# Patient Record
Sex: FEMALE | Race: WHITE | Employment: UNEMPLOYED | URBAN - METROPOLITAN AREA
[De-identification: names, ages, dates, MRNs, and addresses within clinical notes are randomized per-mention and may not be internally consistent; named-entity substitution may affect disease eponyms.]

---

## 2017-03-08 ENCOUNTER — OFFICE VISIT (OUTPATIENT)
Dept: FAMILY MEDICINE CLINIC | Age: 57
End: 2017-03-08

## 2017-03-08 VITALS
TEMPERATURE: 97.3 F | WEIGHT: 234.2 LBS | SYSTOLIC BLOOD PRESSURE: 128 MMHG | DIASTOLIC BLOOD PRESSURE: 75 MMHG | RESPIRATION RATE: 12 BRPM | HEART RATE: 86 BPM | BODY MASS INDEX: 43.54 KG/M2 | OXYGEN SATURATION: 97 %

## 2017-03-08 DIAGNOSIS — F32.A DEPRESSION, UNSPECIFIED DEPRESSION TYPE: ICD-10-CM

## 2017-03-08 DIAGNOSIS — G47.33 OBSTRUCTIVE SLEEP APNEA SYNDROME: ICD-10-CM

## 2017-03-08 DIAGNOSIS — F98.8 ATTENTION DEFICIT DISORDER: Primary | ICD-10-CM

## 2017-03-08 DIAGNOSIS — Z13.220 LIPID SCREENING: ICD-10-CM

## 2017-03-08 DIAGNOSIS — F50.81 BINGE EATING DISORDER: ICD-10-CM

## 2017-03-08 DIAGNOSIS — Z13.1 DIABETES MELLITUS SCREENING: ICD-10-CM

## 2017-03-08 DIAGNOSIS — E03.9 ACQUIRED HYPOTHYROIDISM: ICD-10-CM

## 2017-03-08 DIAGNOSIS — Z11.59 NEED FOR HEPATITIS C SCREENING TEST: ICD-10-CM

## 2017-03-08 LAB
A/G RATIO: 1.6 (ref 1.1–2.2)
ALBUMIN SERPL-MCNC: 4.6 G/DL (ref 3.4–5)
ALP BLD-CCNC: 89 U/L (ref 40–129)
ALT SERPL-CCNC: 37 U/L (ref 10–40)
ANION GAP SERPL CALCULATED.3IONS-SCNC: 16 MMOL/L (ref 3–16)
AST SERPL-CCNC: 23 U/L (ref 15–37)
BILIRUB SERPL-MCNC: <0.2 MG/DL (ref 0–1)
BUN BLDV-MCNC: 15 MG/DL (ref 7–20)
CALCIUM SERPL-MCNC: 9.8 MG/DL (ref 8.3–10.6)
CHLORIDE BLD-SCNC: 101 MMOL/L (ref 99–110)
CHOLESTEROL, TOTAL: 220 MG/DL (ref 0–199)
CO2: 25 MMOL/L (ref 21–32)
CREAT SERPL-MCNC: 0.8 MG/DL (ref 0.6–1.1)
GFR AFRICAN AMERICAN: >60
GFR NON-AFRICAN AMERICAN: >60
GLOBULIN: 2.9 G/DL
GLUCOSE BLD-MCNC: 93 MG/DL (ref 70–99)
HDLC SERPL-MCNC: 89 MG/DL (ref 40–60)
HEPATITIS C ANTIBODY INTERPRETATION: NORMAL
LDL CHOLESTEROL CALCULATED: 112 MG/DL
POTASSIUM SERPL-SCNC: 4.4 MMOL/L (ref 3.5–5.1)
SODIUM BLD-SCNC: 142 MMOL/L (ref 136–145)
T4 FREE: 1 NG/DL (ref 0.9–1.8)
TOTAL PROTEIN: 7.5 G/DL (ref 6.4–8.2)
TRIGL SERPL-MCNC: 97 MG/DL (ref 0–150)
TSH REFLEX: 4.91 UIU/ML (ref 0.27–4.2)
VLDLC SERPL CALC-MCNC: 19 MG/DL

## 2017-03-08 PROCEDURE — 99214 OFFICE O/P EST MOD 30 MIN: CPT | Performed by: FAMILY MEDICINE

## 2017-03-08 RX ORDER — DEXTROAMPHETAMINE SACCHARATE, AMPHETAMINE ASPARTATE MONOHYDRATE, DEXTROAMPHETAMINE SULFATE AND AMPHETAMINE SULFATE 5; 5; 5; 5 MG/1; MG/1; MG/1; MG/1
20 CAPSULE, EXTENDED RELEASE ORAL EVERY MORNING
Qty: 30 CAPSULE | Refills: 0 | Status: CANCELLED | OUTPATIENT
Start: 2017-05-07

## 2017-03-08 RX ORDER — DEXTROAMPHETAMINE SACCHARATE, AMPHETAMINE ASPARTATE MONOHYDRATE, DEXTROAMPHETAMINE SULFATE AND AMPHETAMINE SULFATE 5; 5; 5; 5 MG/1; MG/1; MG/1; MG/1
20 CAPSULE, EXTENDED RELEASE ORAL EVERY MORNING
Qty: 30 CAPSULE | Refills: 0 | Status: CANCELLED | OUTPATIENT
Start: 2017-03-08

## 2017-03-08 RX ORDER — LEVOTHYROXINE SODIUM 0.15 MG/1
150 TABLET ORAL DAILY
Qty: 90 TABLET | Refills: 1 | Status: SHIPPED | OUTPATIENT
Start: 2017-03-08 | End: 2017-06-05 | Stop reason: SDUPTHER

## 2017-03-08 RX ORDER — DEXTROAMPHETAMINE SACCHARATE, AMPHETAMINE ASPARTATE MONOHYDRATE, DEXTROAMPHETAMINE SULFATE AND AMPHETAMINE SULFATE 5; 5; 5; 5 MG/1; MG/1; MG/1; MG/1
20 CAPSULE, EXTENDED RELEASE ORAL EVERY MORNING
Qty: 30 CAPSULE | Refills: 0 | Status: CANCELLED | OUTPATIENT
Start: 2017-04-07

## 2017-03-08 RX ORDER — VENLAFAXINE HYDROCHLORIDE 37.5 MG/1
37.5 CAPSULE, EXTENDED RELEASE ORAL DAILY
Qty: 30 CAPSULE | Refills: 5 | Status: SHIPPED | OUTPATIENT
Start: 2017-03-08 | End: 2017-08-23 | Stop reason: SDUPTHER

## 2017-05-11 ENCOUNTER — OFFICE VISIT (OUTPATIENT)
Dept: FAMILY MEDICINE CLINIC | Age: 57
End: 2017-05-11

## 2017-05-11 VITALS
TEMPERATURE: 98.1 F | WEIGHT: 240.6 LBS | DIASTOLIC BLOOD PRESSURE: 65 MMHG | HEART RATE: 96 BPM | SYSTOLIC BLOOD PRESSURE: 141 MMHG | RESPIRATION RATE: 18 BRPM | BODY MASS INDEX: 44.72 KG/M2

## 2017-05-11 DIAGNOSIS — H66.001 ACUTE SUPPURATIVE OTITIS MEDIA OF RIGHT EAR WITHOUT SPONTANEOUS RUPTURE OF TYMPANIC MEMBRANE, RECURRENCE NOT SPECIFIED: Primary | ICD-10-CM

## 2017-05-11 PROCEDURE — 99213 OFFICE O/P EST LOW 20 MIN: CPT | Performed by: FAMILY MEDICINE

## 2017-05-11 RX ORDER — POLYMYXIN B SULFATE AND TRIMETHOPRIM 1; 10000 MG/ML; [USP'U]/ML
1 SOLUTION OPHTHALMIC EVERY 4 HOURS
COMMUNITY
End: 2017-06-05

## 2017-05-11 RX ORDER — PREDNISONE 20 MG/1
20 TABLET ORAL 2 TIMES DAILY
Qty: 8 TABLET | Refills: 0 | Status: SHIPPED | OUTPATIENT
Start: 2017-05-11 | End: 2017-05-15

## 2017-05-11 RX ORDER — SULFAMETHOXAZOLE AND TRIMETHOPRIM 800; 160 MG/1; MG/1
1 TABLET ORAL 2 TIMES DAILY
COMMUNITY
End: 2017-06-05

## 2017-05-11 RX ORDER — CEFDINIR 300 MG/1
300 CAPSULE ORAL 2 TIMES DAILY
Qty: 20 CAPSULE | Refills: 0 | Status: SHIPPED | OUTPATIENT
Start: 2017-05-11 | End: 2017-05-21

## 2017-05-26 ENCOUNTER — TELEPHONE (OUTPATIENT)
Dept: SLEEP MEDICINE | Age: 57
End: 2017-05-26

## 2017-06-05 ENCOUNTER — OFFICE VISIT (OUTPATIENT)
Dept: FAMILY MEDICINE CLINIC | Age: 57
End: 2017-06-05

## 2017-06-05 VITALS
HEART RATE: 103 BPM | TEMPERATURE: 98.2 F | RESPIRATION RATE: 14 BRPM | SYSTOLIC BLOOD PRESSURE: 138 MMHG | OXYGEN SATURATION: 94 % | DIASTOLIC BLOOD PRESSURE: 85 MMHG | BODY MASS INDEX: 44.09 KG/M2 | WEIGHT: 237.2 LBS

## 2017-06-05 DIAGNOSIS — F50.81 BINGE EATING DISORDER: ICD-10-CM

## 2017-06-05 DIAGNOSIS — F32.A DEPRESSION, UNSPECIFIED DEPRESSION TYPE: ICD-10-CM

## 2017-06-05 DIAGNOSIS — E03.9 ACQUIRED HYPOTHYROIDISM: ICD-10-CM

## 2017-06-05 DIAGNOSIS — H66.91 RIGHT OTITIS MEDIA, UNSPECIFIED CHRONICITY, UNSPECIFIED OTITIS MEDIA TYPE: ICD-10-CM

## 2017-06-05 DIAGNOSIS — F98.8 ATTENTION DEFICIT DISORDER: Primary | ICD-10-CM

## 2017-06-05 LAB — TSH REFLEX: 2.85 UIU/ML (ref 0.27–4.2)

## 2017-06-05 PROCEDURE — 99214 OFFICE O/P EST MOD 30 MIN: CPT | Performed by: FAMILY MEDICINE

## 2017-06-05 RX ORDER — BUPROPION HYDROCHLORIDE 300 MG/1
300 TABLET ORAL EVERY MORNING
Qty: 30 TABLET | Refills: 5 | Status: SHIPPED | OUTPATIENT
Start: 2017-06-05 | End: 2017-09-22 | Stop reason: SDUPTHER

## 2017-06-05 RX ORDER — LANOLIN ALCOHOL/MO/W.PET/CERES
3 CREAM (GRAM) TOPICAL DAILY
COMMUNITY

## 2017-06-05 RX ORDER — SERTRALINE HYDROCHLORIDE 100 MG/1
TABLET, FILM COATED ORAL
Qty: 60 TABLET | Refills: 5 | Status: SHIPPED | OUTPATIENT
Start: 2017-06-05 | End: 2017-11-15 | Stop reason: ALTCHOICE

## 2017-06-05 RX ORDER — LEVOTHYROXINE SODIUM 0.15 MG/1
150 TABLET ORAL DAILY
Qty: 90 TABLET | Refills: 1 | Status: SHIPPED | OUTPATIENT
Start: 2017-06-05 | End: 2018-03-04 | Stop reason: SDUPTHER

## 2017-06-12 ENCOUNTER — TELEPHONE (OUTPATIENT)
Dept: FAMILY MEDICINE CLINIC | Age: 57
End: 2017-06-12

## 2017-07-13 ENCOUNTER — INITIAL CONSULT (OUTPATIENT)
Dept: PULMONOLOGY | Age: 57
End: 2017-07-13

## 2017-07-13 VITALS
WEIGHT: 238 LBS | HEART RATE: 98 BPM | BODY MASS INDEX: 43.79 KG/M2 | SYSTOLIC BLOOD PRESSURE: 118 MMHG | HEIGHT: 62 IN | OXYGEN SATURATION: 98 % | DIASTOLIC BLOOD PRESSURE: 78 MMHG

## 2017-07-13 DIAGNOSIS — F32.A DEPRESSION, UNSPECIFIED DEPRESSION TYPE: Chronic | ICD-10-CM

## 2017-07-13 DIAGNOSIS — G47.33 OBSTRUCTIVE SLEEP APNEA SYNDROME: Primary | Chronic | ICD-10-CM

## 2017-07-13 DIAGNOSIS — E03.9 ACQUIRED HYPOTHYROIDISM: Chronic | ICD-10-CM

## 2017-07-13 DIAGNOSIS — E66.01 MORBID OBESITY, UNSPECIFIED OBESITY TYPE (HCC): Chronic | ICD-10-CM

## 2017-07-13 PROCEDURE — 99214 OFFICE O/P EST MOD 30 MIN: CPT | Performed by: INTERNAL MEDICINE

## 2017-07-13 ASSESSMENT — ENCOUNTER SYMPTOMS
APNEA: 0
CHOKING: 0
SHORTNESS OF BREATH: 0
COUGH: 0
RHINORRHEA: 0

## 2017-07-13 ASSESSMENT — SLEEP AND FATIGUE QUESTIONNAIRES
HOW LIKELY ARE YOU TO NOD OFF OR FALL ASLEEP WHILE SITTING AND TALKING TO SOMEONE: 0
ESS TOTAL SCORE: 13
HOW LIKELY ARE YOU TO NOD OFF OR FALL ASLEEP IN A CAR, WHILE STOPPED FOR A FEW MINUTES IN TRAFFIC: 1
HOW LIKELY ARE YOU TO NOD OFF OR FALL ASLEEP WHILE WATCHING TV: 1
HOW LIKELY ARE YOU TO NOD OFF OR FALL ASLEEP WHILE LYING DOWN TO REST IN THE AFTERNOON WHEN CIRCUMSTANCES PERMIT: 3
HOW LIKELY ARE YOU TO NOD OFF OR FALL ASLEEP WHILE SITTING INACTIVE IN A PUBLIC PLACE: 1
HOW LIKELY ARE YOU TO NOD OFF OR FALL ASLEEP WHILE SITTING AND READING: 2
HOW LIKELY ARE YOU TO NOD OFF OR FALL ASLEEP WHEN YOU ARE A PASSENGER IN A CAR FOR AN HOUR WITHOUT A BREAK: 3
HOW LIKELY ARE YOU TO NOD OFF OR FALL ASLEEP WHILE SITTING QUIETLY AFTER LUNCH WITHOUT ALCOHOL: 2

## 2017-08-15 DIAGNOSIS — R92.8 MAMMOGRAM ABNORMAL: Primary | ICD-10-CM

## 2017-08-23 ENCOUNTER — OFFICE VISIT (OUTPATIENT)
Dept: FAMILY MEDICINE CLINIC | Age: 57
End: 2017-08-23

## 2017-08-23 VITALS
WEIGHT: 240.8 LBS | DIASTOLIC BLOOD PRESSURE: 59 MMHG | RESPIRATION RATE: 16 BRPM | SYSTOLIC BLOOD PRESSURE: 124 MMHG | TEMPERATURE: 97.5 F | HEART RATE: 87 BPM | BODY MASS INDEX: 44.04 KG/M2 | OXYGEN SATURATION: 93 %

## 2017-08-23 DIAGNOSIS — F98.8 ATTENTION DEFICIT DISORDER: ICD-10-CM

## 2017-08-23 DIAGNOSIS — F50.81 BINGE EATING DISORDER: ICD-10-CM

## 2017-08-23 DIAGNOSIS — F32.A DEPRESSION, UNSPECIFIED DEPRESSION TYPE: ICD-10-CM

## 2017-08-23 PROCEDURE — 99214 OFFICE O/P EST MOD 30 MIN: CPT | Performed by: FAMILY MEDICINE

## 2017-08-23 RX ORDER — VENLAFAXINE HYDROCHLORIDE 37.5 MG/1
37.5 CAPSULE, EXTENDED RELEASE ORAL DAILY
Qty: 30 CAPSULE | Refills: 5 | Status: SHIPPED | OUTPATIENT
Start: 2017-08-23 | End: 2017-08-30

## 2017-08-23 ASSESSMENT — PATIENT HEALTH QUESTIONNAIRE - PHQ9
SUM OF ALL RESPONSES TO PHQ QUESTIONS 1-9: 0
SUM OF ALL RESPONSES TO PHQ9 QUESTIONS 1 & 2: 0
1. LITTLE INTEREST OR PLEASURE IN DOING THINGS: 0
2. FEELING DOWN, DEPRESSED OR HOPELESS: 0

## 2017-08-24 ENCOUNTER — TELEPHONE (OUTPATIENT)
Dept: PSYCHOLOGY | Age: 57
End: 2017-08-24

## 2017-08-25 NOTE — TELEPHONE ENCOUNTER
Called pt and she will actually be out of town she has rescheduled her appointment 10/9/17 at [phone]

## 2017-08-30 DIAGNOSIS — F33.1 MODERATE EPISODE OF RECURRENT MAJOR DEPRESSIVE DISORDER (HCC): Primary | Chronic | ICD-10-CM

## 2017-08-30 RX ORDER — DESVENLAFAXINE 50 MG/1
50 TABLET, EXTENDED RELEASE ORAL DAILY
Qty: 30 TABLET | Refills: 3 | Status: SHIPPED | OUTPATIENT
Start: 2017-08-30 | End: 2018-01-09 | Stop reason: SDUPTHER

## 2017-09-05 ENCOUNTER — TELEPHONE (OUTPATIENT)
Dept: FAMILY MEDICINE CLINIC | Age: 57
End: 2017-09-05

## 2017-09-11 NOTE — TELEPHONE ENCOUNTER
I followed up on this request, and noticed that there were some gaps in the PA done previously, so I went ahead and resubmitted all info in completion. Now we are just waiting on the final determination from [place]. CoverMyMeds Key: [id number]    (It looks like patient has attemped + failed all necessary meds, so this will likely come back approved. Please notify patient at [phone] [facility]) as soon as our update is received.  Thank you!)
Prior Authorization for medication, DesvenlafaXine Suc ER 50 MG T  has been DENIED by [employer] due to not enough information. Please advise on appeal or change in medications. Please see attached scanned denial letter for more information.
Time.  Sincerely,  Prior [employer]  We have sent a copy of this notice to the member and prescriber.   PA# [employer] IV[employer] - [id number]

## 2017-09-22 DIAGNOSIS — F32.A DEPRESSION, UNSPECIFIED DEPRESSION TYPE: ICD-10-CM

## 2017-09-22 RX ORDER — BUPROPION HYDROCHLORIDE 300 MG/1
300 TABLET ORAL EVERY MORNING
Qty: 30 TABLET | Refills: 5 | Status: SHIPPED | OUTPATIENT
Start: 2017-09-22 | End: 2018-03-08 | Stop reason: SDUPTHER

## 2017-10-09 ENCOUNTER — OFFICE VISIT (OUTPATIENT)
Dept: PSYCHOLOGY | Age: 57
End: 2017-10-09

## 2017-10-09 DIAGNOSIS — F33.1 MAJOR DEPRESSIVE DISORDER, RECURRENT EPISODE, MODERATE WITH ANXIOUS DISTRESS (HCC): ICD-10-CM

## 2017-10-09 PROCEDURE — 90791 PSYCH DIAGNOSTIC EVALUATION: CPT | Performed by: PSYCHOLOGIST

## 2017-10-09 ASSESSMENT — PATIENT HEALTH QUESTIONNAIRE - PHQ9
1. LITTLE INTEREST OR PLEASURE IN DOING THINGS: 2
3. TROUBLE FALLING OR STAYING ASLEEP: 3
2. FEELING DOWN, DEPRESSED OR HOPELESS: 2
SUM OF ALL RESPONSES TO PHQ QUESTIONS 1-9: 19
7. TROUBLE CONCENTRATING ON THINGS, SUCH AS READING THE NEWSPAPER OR WATCHING TELEVISION: 3
8. MOVING OR SPEAKING SO SLOWLY THAT OTHER PEOPLE COULD HAVE NOTICED. OR THE OPPOSITE, BEING SO FIGETY OR RESTLESS THAT YOU HAVE BEEN MOVING AROUND A LOT MORE THAN USUAL: 1
10. IF YOU CHECKED OFF ANY PROBLEMS, HOW DIFFICULT HAVE THESE PROBLEMS MADE IT FOR YOU TO DO YOUR WORK, TAKE CARE OF THINGS AT HOME, OR GET ALONG WITH OTHER PEOPLE: 2
9. THOUGHTS THAT YOU WOULD BE BETTER OFF DEAD, OR OF HURTING YOURSELF: 0
SUM OF ALL RESPONSES TO PHQ9 QUESTIONS 1 & 2: 4
5. POOR APPETITE OR OVEREATING: 3
6. FEELING BAD ABOUT YOURSELF - OR THAT YOU ARE A FAILURE OR HAVE LET YOURSELF OR YOUR FAMILY DOWN: 2
4. FEELING TIRED OR HAVING LITTLE ENERGY: 3

## 2017-10-09 ASSESSMENT — ANXIETY QUESTIONNAIRES
2. NOT BEING ABLE TO STOP OR CONTROL WORRYING: 1-SEVERAL DAYS
4. TROUBLE RELAXING: 1-SEVERAL DAYS
6. BECOMING EASILY ANNOYED OR IRRITABLE: 1-SEVERAL DAYS
1. FEELING NERVOUS, ANXIOUS, OR ON EDGE: 1-SEVERAL DAYS
3. WORRYING TOO MUCH ABOUT DIFFERENT THINGS: 1-SEVERAL DAYS
5. BEING SO RESTLESS THAT IT IS HARD TO SIT STILL: 1-SEVERAL DAYS
GAD7 TOTAL SCORE: 7
7. FEELING AFRAID AS IF SOMETHING AWFUL MIGHT HAPPEN: 1-SEVERAL DAYS

## 2017-10-09 NOTE — PROGRESS NOTES
Behavioral Health Consultation  Norm Khna Psy.D. Psychologist  10/9/2017  9:25 AM      Time spent with Patient: 30 minutes  This is patient's first [facility] appointment. Reason for Consult:  depression  Referring Provider: Isaías Carter MD  [address]  29 [facility], 727 [facility]    Pt provided informed consent for the behavioral health program. Discussed with patient model of service to include the limits of confidentiality (i.e. abuse reporting, suicide intervention, etc.) and short-term intervention focused approach. Pt indicated understanding. Feedback given to PCP. S:  Pt reported that she has been considering General acute hospital services for a while. Suffers from depression and ADHD. Started taking ADs 17 years ago. Pretty stable since 2005 until recently, when she realized her meds were no longer as effective. Also wanting to get off some meds bc she felt like she was on too much.  5 years ago, was very hard on her. Has 2 sons, oldest living in [place], youngest will graduate from [employer] in May. 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 Hasn't returned to work. Lacks structure in her days. Went through 10 years of therapy until her therapist her retired 7-8 years ago. Then did peer-support counseling through her [facility] for 4-5 years. Feeling stuck but wanting to move forward. Wants to find motivation to be more productive and motivated, like she used to be. Wants to establish an exercise routine. Used to exercise 6 days/week to manage her depression, which was very effective for her. Has a treadmill at home with a workspace set up, not using it. Volunteered for years but realized it felt more like \"a hiding place\" so doing this less now. Spends most of her time on the computer lately. Travels to see out of state family. No regular sleep schedule, although she knows this would be helpful. Using a CPAP. Currently on Wellbutrin XL 300mg and Pristiq 50mg.  Also taking Vyvanse 50mg. Off Effexor after years, and off Zoloft now too. O:  MSE:  Appearance: good hygiene and appropriate attire  Attitude: cooperative, friendly, tearful and moderate distress  Consciousness: alert  Orientation: oriented to person, place, time, general circumstance  Memory: recent and remote memory intact  Attention/Concentration: intact during session  Psychomotor Activity: normal  Eye Contact: normal  Speech: normal rate and volume, well-articulated, verbose  Mood: dyshporic and anxious  Affect: congruent with thought content and mood  Perception: within normal limits  Thought Content: within normal limits   Thought Process: logical, coherent, tangential and circumstantial at times, some difficulty redirecting  Insight: fair  Judgment: intact  Morbid ideation: no  Suicide Assessment: no suicidal ideation      History:    Medications:   Current Outpatient Prescriptions   Medication Sig Dispense Refill    buPROPion (WELLBUTRIN XL) 300 MG extended release tablet Take 1 tablet by mouth every morning 30 tablet 5    desvenlafaxine succinate (PRISTIQ) 50 MG TB24 extended release tablet Take 1 tablet by mouth daily 30 tablet 3    [START ON 10/22/2017] lisdexamfetamine (VYVANSE) 50 MG capsule Take 1 capsule by mouth every morning . 30 capsule 0    lisdexamfetamine (VYVANSE) 50 MG capsule Take 1 capsule by mouth every morning . 30 capsule 0    lisdexamfetamine (VYVANSE) 50 MG capsule Take 1 capsule by mouth every morning . 30 capsule 0    levothyroxine (SYNTHROID) 150 MCG tablet Take 1 tablet by mouth daily 90 tablet 1    sertraline (ZOLOFT) 100 MG tablet TAKE TWO TABLETS BY MOUTH EVERY MORNING AT THE SAME TIME 60 tablet 5    Omega-3 Fatty Acids (FISH OIL ADULT GUMMIES PO) Take by mouth      melatonin 3 MG TABS tablet Take 3 mg by mouth daily      BIOTIN PO Take by mouth      docusate sodium (COLACE) 100 MG capsule Take 100 mg by mouth daily.  FIBER, CORN DEXTRIN, PO Take 1 tablet by mouth daily.       Magnesium 500 MG CAPS Take 1 tablet by mouth daily.  calcium citrate-vitamin D (CITRICAL + D) 315-250 MG-UNIT TABS Take  by mouth.  vitamin B-12 (CYANOCOBALAMIN) 500 MCG tablet Take 500 mcg by mouth daily.  therapeutic multivitamin-minerals (THERAGRAN-M) tablet Take 1 tablet by mouth daily. No current facility-administered medications for this visit. Social History:   Social History     Social History    Marital status:      Spouse name: N/A    Number of children: 2    Years of education: N/A     Occupational History    Not on file. Social History Main Topics    Smoking status: Never Smoker    Smokeless tobacco: Never Used    Alcohol use 0.0 oz/week     0 drink(s) per week      Comment: infrequent    Drug use: No    Sexual activity: Not Currently     Other Topics Concern    Not on file     Social History Narrative    No narrative on file       TOBACCO:   reports that she has never smoked. She has never used smokeless tobacco.  ETOH:   reports that she drinks alcohol. Family History:   Family History   Problem Relation Age of Onset    Diabetes Mother     Osteoporosis Mother     High Cholesterol Father        A:  Pt reported a history of chronic depressive episodes that she has managed in the past with antidepressants and psychotherapy. Currently taking Wellbutrin XL 300mg and recently added Pristiq 50mg. Also suffers from ADHD, currently managed with Vyvanse. Lacks structure and a sense of purpose in her life, now that her sons are grown. Good social support from long-distance family, including her sons. Pt discussed her desire to resume a regular exercise routine, which she knows would help her. Normalized and validated pt's distress. Provided psychoeducation about depression and CBT. Engaged in behavioral goal-setting to increase structure in her daily routine. At pt's request, provided handout about mindfulness, which will be discussed further at next visit.

## 2017-10-09 NOTE — PATIENT INSTRUCTIONS
active! Your body produces its own anti-depressants every time you exercise or do something pleasurable. Regular exercise is one of the very best ways to improve your mood. In fact some studies have shown that a solid exercise program is as effective as psychotherapy or anti-depressant medication for some people. Force yourself to do something you found pleasurable before depression. This may be different for everyone and it doesnt matter if its gardening, playing bridge, walking, reading a novel, or simply talking to a close friend. What matters is that YOU find the activity pleasurable! Even if you dont feel like doing something pleasurable for yourself, DO IT ANYWAY. We call this the fake it until you make it principle. Educate yourself! Often people feel powerless against medical conditions because they do not understand what is happening in their body. Just by reading this handout you know more than most people about depression. Knowledge is power when you can apply it, and make yourself feel better. *See recommended reading list at the bottom of this handout    Begin to notice unhealthy and unhelpful thoughts! In addition to how we behave, how we think influences our mood directly. Notice recurrent or alarming thoughts that have an impact on your mood. Ask yourself is this type of thinking helping me or hurting me?  if your answer is its hurting me here are some things you can do: *see Disputation: Challenging Upsetting Thinking\" section of handout    - Challenge the negative thought. Is it truly accurate? Wheres the proof? Become your own  and collect the facts.  - Reframe the negative thought. How can I think differently about this problem, situation, or view of myself? Allow yourself to view a situation from more than one angle, how might my spouse, friend, or someone I admire view this same problem?  - Use the best friend scenario.  How would you help your best friend if he or she was having these same thoughts? Would you criticize him or her as harshly as you criticize yourself? -   Remember, YOU know YOU better than anyone else. You likely know what kinds of activities, thoughts and reinforcement you respond to. Doing whats easiest and most doable is the key. Pick 1 or 2 things that are easy and get started feeling better TODAY! * Use the following handout sections to guide you through behavioral and thinking exercises to help you manage your depressive symptoms, improve your functioning and to begin living your life well again. Recommended readings on managing depression    - Feeling Good by Dr. Darlene Guadalupe. Burns     - Mind over Leti[place]Vadim by Mariama Singh and [facility] by Dr. Bull Kacey by Dr. Quenton Schirmer. S[place]ky    Disputation:  Challenging Upsetting Thinking    Examine your thoughts for key words:  1. must, need, got to, have to, should (unrealistic standards)  2. never, always, completely, totally, all everything, everyone (predictions /  Labeling)  3. awful, terrible, horrible, unbearable, disaster, worst ever (labeling / predictions)  4. jerk, slob, creep, hypocrite, bully, stupid (labels)    Dispute or question the accuracy of the negative thoughts:  1. Am I upsetting myself unnecessarily? How can I see this another way? 2. Is my thinking working for or against me? How could I view this in a less upsetting way? 3. What am I demanding must happen? What do I want or prefer, rather than need? 4. Am I making something too terrible? Is it really that awful? What would be so terrible about that? 5. Am I labeling a person? What is the action that I dont like? 6. Whats untrue about my thoughts? How can I stick to the facts? Whats the proof for what I am thinking or believing about this? 7. Am I using extreme, black-and-white language?   What less extreme words might be more accurate? 8. Am I fortune telling or mind reading in a way that gets me upset or unhappy? What are the odds (percent chance -- e.g., there is a 5% chance. ..) that it will really turn out the way Im thinking or imagining? 9. What are my options in this situation? How would I like to respond? 10. Create more moderate, helpful, or realistic statements to replace the upsetting ones. 11. Have I had any experiences that show that this thought might not be totally true? 12. If my best friend or someone I loved had this thought, what would I tell them? 15. If my best friend or someone I loved knew I was thinking this thought, what would they say to me? What evidence would they point out to me that would suggest that my thought is not completely true? 14. Are there strengths in me or positives in the situation that I am ignoring? Am I underestimating my ability to cope with unfortunate circumstances? 15. When I am not feeling this way, do I think about this situation any differently? How?  16. Have I been in this type of situation before? What happened? What have I learned from prior experiences that could help me now? 17. Five years from now, if I look back on this situation, will I look at it any differently? Will I focus on any different part of my experience? 25. Am I blaming myself for something over which I do not have complete control?             Depression Cycle         Thoughts        -There is no point in doing anything        -I dont have the energy        -I dont feel like it        -I will probably fail        -I need to rest more        -Ill do it later                       Depressive Symptoms     Behaviors  -Tired/Overwhelmed      -Stay in Bed  -Bored        -Avoid People  -Depressed       -Avoid Fun Activities  -Feeling Worthless      -Avoid Work  -Apathetic/Discouraged     -Guilty                Consequences of Behaviors      -Isolated from friends and family      -Decreased number of rewarding experiences      -Decreased sense of accomplishment and pleasure      -Discouraged      -Sink deeper and deeper into a state of paralysis      -Decreased productivity convinces you that you are inadequate        Depression is an extremely common problem  stimates are that up to 25 million, or one in ten Americans, experience depression each year and that over half of us will experience a depressive episode at some time in our lives. Depression has been called the worlds number one public health problem both because it is so prevalent and because it makes any other pre-existing health problems even worse. The fact that a large part of our population will experience significant depressive symptoms at some point in their lives suggests that depression is not a sign of weakness.   To a large degree, it is often just part of life. 10 common symptoms of depression:     Significantly sad or irritable moods   Sleep problems (too little/too much)   Lack of interest in others or in activities normally enjoyed   Guilt, self-critical thoughts, feelings of inadequacy or worthlessness   Poor energy/feeling tired most of the time   Concentration/memory difficulties   Appetite/eating changes  poor or excessive appetite/eating   Feeling very slowed down or restless and on edge   More aches and pains or physical complaints   Thoughts of death or suicide    What causes depression? If these symptoms are persistent (lasting two weeks or more) and are severe enough to impact your functioning or quality of life, this may indicate the presence of clinical depression. Depression is a complex problem that has multiple interrelated causes or influences.   Some possible causes of depression include the following:     Decreases in rewarding experiences   Problems in relationships    Chemical/biological imbalance   Poor communication   [place] negative thinking about oneself or

## 2017-10-12 ENCOUNTER — OFFICE VISIT (OUTPATIENT)
Dept: PULMONOLOGY | Age: 57
End: 2017-10-12

## 2017-10-12 VITALS
OXYGEN SATURATION: 98 % | HEART RATE: 90 BPM | SYSTOLIC BLOOD PRESSURE: 120 MMHG | WEIGHT: 237 LBS | HEIGHT: 62 IN | BODY MASS INDEX: 43.61 KG/M2 | DIASTOLIC BLOOD PRESSURE: 72 MMHG

## 2017-10-12 DIAGNOSIS — E66.01 MORBID OBESITY, UNSPECIFIED OBESITY TYPE (HCC): Chronic | ICD-10-CM

## 2017-10-12 DIAGNOSIS — G47.33 OBSTRUCTIVE SLEEP APNEA SYNDROME: Primary | Chronic | ICD-10-CM

## 2017-10-12 DIAGNOSIS — F32.A DEPRESSION, UNSPECIFIED DEPRESSION TYPE: Chronic | ICD-10-CM

## 2017-10-12 DIAGNOSIS — E03.9 ACQUIRED HYPOTHYROIDISM: Chronic | ICD-10-CM

## 2017-10-12 PROCEDURE — 99214 OFFICE O/P EST MOD 30 MIN: CPT | Performed by: NURSE PRACTITIONER

## 2017-10-12 ASSESSMENT — SLEEP AND FATIGUE QUESTIONNAIRES
HOW LIKELY ARE YOU TO NOD OFF OR FALL ASLEEP WHILE SITTING QUIETLY AFTER LUNCH WITHOUT ALCOHOL: 1
HOW LIKELY ARE YOU TO NOD OFF OR FALL ASLEEP WHILE WATCHING TV: 1
HOW LIKELY ARE YOU TO NOD OFF OR FALL ASLEEP IN A CAR, WHILE STOPPED FOR A FEW MINUTES IN TRAFFIC: 1
HOW LIKELY ARE YOU TO NOD OFF OR FALL ASLEEP WHEN YOU ARE A PASSENGER IN A CAR FOR AN HOUR WITHOUT A BREAK: 3
HOW LIKELY ARE YOU TO NOD OFF OR FALL ASLEEP WHILE LYING DOWN TO REST IN THE AFTERNOON WHEN CIRCUMSTANCES PERMIT: 2
HOW LIKELY ARE YOU TO NOD OFF OR FALL ASLEEP WHILE SITTING INACTIVE IN A PUBLIC PLACE: 1
HOW LIKELY ARE YOU TO NOD OFF OR FALL ASLEEP WHILE SITTING AND READING: 2
ESS TOTAL SCORE: 11
HOW LIKELY ARE YOU TO NOD OFF OR FALL ASLEEP WHILE SITTING AND TALKING TO SOMEONE: 0

## 2017-10-12 ASSESSMENT — ENCOUNTER SYMPTOMS
APNEA: 0
ABDOMINAL PAIN: 0
SHORTNESS OF BREATH: 0
ABDOMINAL DISTENTION: 0
COUGH: 0
SINUS PRESSURE: 0
RHINORRHEA: 0

## 2017-10-30 ENCOUNTER — OFFICE VISIT (OUTPATIENT)
Dept: PSYCHOLOGY | Age: 57
End: 2017-10-30

## 2017-10-30 DIAGNOSIS — F33.1 MAJOR DEPRESSIVE DISORDER, RECURRENT EPISODE, MODERATE WITH ANXIOUS DISTRESS (HCC): Primary | ICD-10-CM

## 2017-10-30 PROCEDURE — 90832 PSYTX W PT 30 MINUTES: CPT | Performed by: PSYCHOLOGIST

## 2017-10-30 ASSESSMENT — ANXIETY QUESTIONNAIRES
3. WORRYING TOO MUCH ABOUT DIFFERENT THINGS: 2-OVER HALF THE DAYS
GAD7 TOTAL SCORE: 8
7. FEELING AFRAID AS IF SOMETHING AWFUL MIGHT HAPPEN: 1-SEVERAL DAYS
2. NOT BEING ABLE TO STOP OR CONTROL WORRYING: 1-SEVERAL DAYS
5. BEING SO RESTLESS THAT IT IS HARD TO SIT STILL: 1-SEVERAL DAYS
4. TROUBLE RELAXING: 1-SEVERAL DAYS
1. FEELING NERVOUS, ANXIOUS, OR ON EDGE: 1-SEVERAL DAYS
6. BECOMING EASILY ANNOYED OR IRRITABLE: 1-SEVERAL DAYS

## 2017-10-30 ASSESSMENT — PATIENT HEALTH QUESTIONNAIRE - PHQ9
3. TROUBLE FALLING OR STAYING ASLEEP: 1
6. FEELING BAD ABOUT YOURSELF - OR THAT YOU ARE A FAILURE OR HAVE LET YOURSELF OR YOUR FAMILY DOWN: 1
4. FEELING TIRED OR HAVING LITTLE ENERGY: 1
9. THOUGHTS THAT YOU WOULD BE BETTER OFF DEAD, OR OF HURTING YOURSELF: 0
SUM OF ALL RESPONSES TO PHQ9 QUESTIONS 1 & 2: 2
8. MOVING OR SPEAKING SO SLOWLY THAT OTHER PEOPLE COULD HAVE NOTICED. OR THE OPPOSITE, BEING SO FIGETY OR RESTLESS THAT YOU HAVE BEEN MOVING AROUND A LOT MORE THAN USUAL: 1
7. TROUBLE CONCENTRATING ON THINGS, SUCH AS READING THE NEWSPAPER OR WATCHING TELEVISION: 1
2. FEELING DOWN, DEPRESSED OR HOPELESS: 1
SUM OF ALL RESPONSES TO PHQ QUESTIONS 1-9: 8
1. LITTLE INTEREST OR PLEASURE IN DOING THINGS: 1
5. POOR APPETITE OR OVEREATING: 1

## 2017-10-30 NOTE — PATIENT INSTRUCTIONS
Goals: 1. Set a regular bedtime. Plan to go to bed by 12pm. Continue working toward getting 7-8 hours of sleep. 2. Limit naps to twice weekly. Set an alarm for 30-45 minutes. Make sure they end by 2pm.   3. Practice being mindful - paying attention to the present moment in a nonjudgmental way  4. Practice diaphragmatic breathing throughout the day  5. Practice grounding exercises - noticing what your 5 senses are experiencing in the moment        Sleep Hygiene Guidelines    Good dental hygiene is important in determining the health of your teeth and gums. We all know we are supposed to brush and floss regularly. Those who do so are more likely to have strong, healthy gums and less cavities. Similarly, good sleep hygiene is important in determining the quality and quantity of your sleep. Below are guidelines for good sleep hygiene practices. Review these guidelines and evaluate how well you practice good sleep hygiene. Caffeine:  Avoid Caffeine After 10AM  Caffeine disturbs sleep, even in people who do not think they experience a stimulation effect. Individuals with insomnia are often more sensitive to mild stimulants than are normal sleepers. Caffeine is found in items such as coffee, tea, soda, chocolate, and many over-the-counter medications (e.g., Excedrin). Its effects can last up to 12 hours. Thus, drinking caffeinated beverages should be avoided after 10AM, and should be limited to no more than two cups. You might consider a trial period of no caffeine if you tend to be sensitive to its effects. Nicotine:  Avoid Nicotine Before Bedtime  Although some smokers claim that smoking helps them relax, nicotine is a stimulant. The relaxing effects occur with the initial entry of the nicotine, but as the nicotine builds in the system it produces an effect similar to caffeine. Thus, smoking, dipping, or chewing tobacco should be avoided near bedtime and during the night.   Dont smoke to get yourself white noise (such as the noise of a fan) or with earplugs. Bedrooms may be darkened with black-out shades or sleep masks can be worn. Position clocks out-of-sight since clock-watching can increase worry about the effects of lack of sleep. Be sure your mattress is not too soft or too firm and that your pillow is the right height and firmness. Eating  A light bedtime snack, such a glass of warm milk, cheese, or a bowl of cereal, can promote sleep. You should avoid the following foods at bedtime:  any caffeinated foods (e.g., chocolate), peanuts, beans, most raw fruits and vegetables (since they may cause gas), and high-fat foods such as potato chips or corn chips. Avoid snacks in the middle of the nights since awakening may become associated with hunger. If you have trouble with regurgitation, be especially careful to avoid heavy meals and spices in the evening. Do not go to bed too hungry or too full. It may help to elevate your head with some pillows. Allow Yourself at [facility] an Hour Before Bedtime to Unwind  Find what works for you to wind down, and perhaps give yourself an hour to do so. Regular Sleep Schedule   Keep a regular time each day, 7 days a week, to get out of bed. Keeping a regular waking time helps set your circadian rhythm so that your body learns to sleep at the desired time. Set a Reasonable Bedtime and Arising Time and Stick to Them   Set the alarm clock and get out of bed at the same time each morning, weekdays and weekends, regardless of your bedtime or the amount of sleep you obtained on the previous night. This guideline is designed to regulate your internal biological clock and reset your clock.       Guidelines for Sleep Stimulus Control    Set a Reasonable Bedtime and Arising Time and Stick to Them  Spending excessive time in bed has two unfortunate consequences: (1) you begin to associate your bedroom with arousal and frustration and (2) your sleep actually becomes more shallow. Restrict your sleep period to the average number of hours you have actually slept per night during the preceding week, plus one additional hour. Set the alarm clock and get out of bed at the same time each morning, weekdays and weekends, regardless of your bedtime or the amount of sleep you obtained on the previous night. You probably will be tempted to stay in bed in the morning if you did not sleep well, but try to maintain your new schedule. This guideline is designed to regulate your internal biological clock and reset your sleep-wake rhythm. Go to Bed Only When You Are Sleepy  There is no reason to go to bed if you are not sleepy. When you go to bed too early, it only gives you more time to become frustrated with your inability to sleep. Individuals often ponder the events of the day, plan the next day's schedule, or worry about their inability to fall asleep. You should therefore delay your bedtime until you are sleepy. This may mean that you go to bed even later than your scheduled bedtime. Remember to stick to your scheduled arising time regardless of the time you go to bed. Get Out of Bed When You Can't Fall Asleep or Go Back to Sleep in About 20-30 Minutes. Return to Bed Only When You Are Sleepy. Repeat This Step as Often as Necessary. Too much time in bed can decrease the sleep quality on subsequent nights and contribute to the maintenance of existing sleep problems. Dont lay in bed for extended times while awake. Although we don't want you to be a clock-watcher, get out of bed if you don't fall asleep fairly soon (i.e., 20-30 minutes). Use this time to engage in a quiet activity (e.g., reading by a soft light). Return to bed only when you are sleepy (e.g., yawning, head bobbing, eyes closing, concentration decreasing). Dont confuse tiredness with sleepiness; they are different. Tiredness doesnt lead to sleep, only sleepiness does.  The object is for you to reconnect your bed with sleeping rather than frustration. It will be demanding to follow this instruction, but many people have found ways to adhere to this guideline. Use the Bed or Bedroom for Sleep and Sex Only. Do NOT Watch TV, Listen to the Radio, Eat, or Read in Your Bedroom. The purpose of this guideline is to associate your bedroom with sleep rather than wakefulness. Just as you may associate the kitchen with hunger, this guideline will help you associate sleep with your bedroom. Follow this rule both during the day and at night. You may have to temporarily move the TV or radio from your bedroom to help you during treatment. If you have difficulty falling asleep without background noise, you can use neutral noise (e.g., white noise machine, fan) to help you fall asleep. If you must use music or the television, recognize that this noise may help you fall asleep but can actually disrupt your sleep later on. Set a timer so the noise will end after 45 minutes. Do Not Nap During the Day. Most sleep experts agree that daytime napping almost always disrupts sleep rhythm, resulting in lighter, more restless sleep, difficulty falling asleep, or early morning awakening. This guideline will help your body acquire a consistent sleep rhythm so that you feel drowsy and ready to sleep at about the same time each night. If you must nap, keep it brief, and take the nap about 8 hours after arising (no later than 4PM). It is best to set an alarm to ensure you dont sleep more than 30 minutes. Diaphragmatic Breathing    \"The entire autonomic nervous system (and through it, our internal organs and glands) is largely driven by our breathing patterns. By changing our breathing we can influence millions of biochemical reactions in our body, producing more relaxing substances such as endorphins and fewer anxiety-producing ones like adrenaline and higher blood acidity.  Mindfulness of the breath is so effective that it is common to all meditative and prayer traditions. \" Anxiety Fear & Breathing - Breathing. com    \"When overcoming high levels of anxiety, it is important to learn the techniques of correct breathing. Many people who live with high levels of anxiety are known to breathe through their chest. Shallow breathing through the chest means you are disrupting the balance of oxygen and carbon dioxide necessary to be in a relaxed state. This type of breathing will perpetuate the symptoms of anxiety. \" [employer]. com      What Is Diaphragmatic Breathing? Diaphragmatic breathing is a technique that helps you slow down your breathing when feeling stressed or anxious by using your diaphragm muscle to bring about a state of physiological relaxation. [place] babies naturally breathe this way, and singers, wind instrument players, and yoga practitioners also use this type of breathing. The diaphragm is a large muscle that rests across the bottom of your rib cage. When you inhale, the diaphragm muscle drops, opening up space so air can come in. When watching someone do this, it looks like your stomach is filling with air. This type of breathing helps activate the part of your nervous system that controls relaxation. It can lead to decreased heart rate, blood pressure, decreased muscle tension, and overall feelings of relaxation. Why Is Diaphragmatic Breathing Important? ? Our breathing changes when we are feeling anxious. We tend to take short,  quick, shallow breaths, or even hyperventilate; this is called overbreathing.    ? It is a good idea to learn techniques for managing overbreathing, because this  type of breathing can actually make you feel even more anxious!    ? Diaphragmatic breathing is a great portable tool that you can use whenever you are feeling anxious. However, it does require some practice.     Key point: Like other anxiety-management skills, the purpose of calm  breathing is not to avoid anxiety at all costs, you fill  your lungs with air, expanding your chest.     Rules of Practice   Try diaphragmatic breathing for one to two minutes throughout the day. You do not need to be feeling anxious to practice  in fact, at first you  should practice while feeling relatively calm. You need to be comfortable  breathing this way when feeling calm before you can feel comfortable doing it  when anxious. Kiran Lamb gradually master this skill and feel the benefits! Once you are comfortable with this technique, you will feel more comfortable using it in situations that cause anxiety. Grounding  Grounding is a technique that helps keep you focused on the present moment by reorienting you to reality in the here-and-now. Grounding skills can be helpful in managing overwhelming feelings or intense anxiety. They help you to regain your mental focus from an often intensely emotional state. Grounding skills occur within two specific approaches:   Sensory Awareness and Cognitive Awareness    1. Sensory Awareness (awareness of senses)    Grounding Exercise #1:   Keep your eyes open, look around the room, notice your surroundings, notice  details.  Hold a pillow, stuffed animal or a ball.  Place a cool cloth or hold something cool (e.g., can of soda) on your forehead or the inside of your wrist   Listen to soothing music   Put your feet firmly on the ground   FOCUS on someones voice or a neutral conversation. Grounding Exercise #2:   The [id number] Exercise   Name 5 things you can see in the room with you.  Name 4 things you can feel Tina Labrador on my back or feet on floor)   Name 3 things you can hear right now (fingers tapping on keyboard or tv)   Name 2 things you can smell right now (or, 2 things you like the smell of)   Name 1 good thing about yourself    Grounding Exercise #3  5/5/5 Grounding Exercise  What are 5 things you can see? What are 5 things you can feel?   What are 5 things you can hear?    -Repeat centered, and its proof that breathing is powerful. The [id number] fabian uses guided breathing exercises to help reduce symptoms of an anxiety attack. If an attack is coming or the symptoms are unbearable, slip away into a quiet room, open your fabian, and let the worry and stress slip away with each breath. Universal Breathing - Pranayama  Platform: Classkick  Cost: Free  Focused breathing exercises can help you regain composure during an anxiety attack. They can also help you prevent an anxiety attack before one starts. Pranayama breathing techniques are common in yoga and have powerful benefits. If youre a beginner, you can benefit from the fabians guided breathing instruction. Sheila Hinton learn how to breathe deeply, hold, and then release with better control. If it works for you, you can purchase the full course which gives you access to the entire program.  Breathing Zone   Platform: Classkick  Cost: $3.99   Breathing Zone uses a clinically proven therapeutic breathing exercise that decreases your heart rate, and with daily use can help manage high blood pressure.    ----------------------------------------------  Headspace: Guided Meditation and Mindfulness  Platform: [employer]  Cost: Monthly subscription starting at $12.99  This fabian provides guided meditations suitable for all levels to help improve focus, exercise mindful awareness, relieve anxiety and reduce stress. Calm: Meditation to Relax, Focus & Sleep Better  Platform: [employer] Digital  Cost: Monthly subscription starting at $9.99  This fabian provides guided meditations for all levels, available in different lengths (3, 5, 10, 15, 20 or 25 minutes) on a variety of topics. 10% Happier: Meditation for [employer]: Stiki Digital  Cost: Monthly subscription starting at $9.99  This fabian was written by a [employer] and includes a variety of meditation teachers who teach meditation in an accessible way.   Self-Help for Anxiety Management regain a sense of calm. Worry Box  Anxiety Self-Help  Platform: Android  Cost: Free  Have you ever wished you could put all your worries in a box, leave them there, and walk away? The Worry Box fabian may let you do just that. The fabian functions a lot like a journal: Write down your thoughts, anxieties, and worries, and let the fabian help you think them through. It will ask questions, give specific anxiety-reducing help, and can even direct you to help you reduce your worries and anxiety. It is all password protected, so you can feel safe sharing the details of your stresses. -----------------------------------------------    Relax Melodies  Platform: iPhone and [email or web address]  Cost: Free  Anxiety can disrupt healthy sleep patterns in more than one way. First, people who dont get enough sleep tend to feel more anxious. Then, people who are more anxious have a difficult time sleeping. Creating a calming environment may help you fall asleep and stay asleep. Relax to one of this fabians 50 sounds. Need the music to stop once youre asleep? Set a timer, and it will stop playing. Set an alarm when you need to be awake. Then, enjoy the benefits of a good nights sleep, free from anxiety. Relaxing Sounds of Nature - Lite  Platform: [employer]  Cost: Free  You can find rest and relaxation without having to travel. The fabian comes with 35 nature tracks, which include soothing classics like crickets chirping, breaking waves, and a serene lake. You can download more free tracks to [employer], and customize a favorite combination that helps you reduce your anxiety in a peaceful setting. Allow the sounds of nature to sweep you away from your worries in the comfort of your living room, office, or bedroom. Nature Sounds Relax and Sleep  Platform: Android  Cost: Free  If you find yourself longing for the sound of the ocean to help you relax, the [employer] and Sleep fabian is for you.  Open this fabian whenever youre feeling anxious or stressed. You can select locations or sounds like the jungle, ocean, or thunder and slip away into a place of relaxation and comfort. If the sounds make you feel sleepy, even better. Use the fabian to doze off into a relaxing slumber  Calming Music to Simplicity  Platform: Android  Cost: Free  [employer] arent the only relaxing tunes in smartphone apps. Music, especially some traditional [employer] music, can be relaxing and soothing. The Calming Music to Simplicity fabian contains nine traditional [facility] music selections. Press play and let your worries melt away. Relax Ocean Waves Sleep by [employer]  Platform: Android  Cost: Free  Living far from a beach doesnt mean you have to be far from total relaxation. Slip on a set of headphones and drift into a distant sand-and-suds oasis. Whether youre trying to head off an anxiety attack or just need to get some good sleep after a few anxious days, the Relax Ocean Waves Sleep fabian helps you find a place of serenity.  --------------------------------------------------------------  Trevin Harper Meditation Relaxation  Platform: Android  Cost: Free  Trevin Jabs is a traditional [facility] that brings together posture, breathing, and the mind to reduce anxiety. This Android fabian connects users with a library of relaxation videos that contain instructions for relaxing and clearing the mind. The videos are created by Dr. Jacklyn Gomes, a psychologist with more than 20 years of experience. In addition to viewing Dr. Danielle cosby, you can read a variety of articles related to anxiety, meditation, and stress management. Anxiety Free  Platform: [employer]   Cost: Free  Meditation requires mindfulness and a sense of presence in your thoughts. Hypnosis is one step beyond meditation. It works by sending signals to your brain and transforming it almost unknowingly.  The creators of this fabian say that its audio recordings contain subliminal signals that speak to the subconscious with powerful pressure on those points when youre feeling overwhelmed, and receive the positive, calming benefit  PTSD : Self-Management of Posttraumatic Stress  Platform: iPhone and Android  Cost: Free  This fabian can help you learn about and manage symptoms that often occur after trauma. Provides information and coping skills for common kinds of posttraumatic stress symptoms and problems, including systematic relaxation and self-help techniques.

## 2017-10-30 NOTE — PROGRESS NOTES
Behavioral Health Consultation  Sonido Walker Psy.D. Psychologist  10/30/2017  9:05 AM      Time spent with Patient: 30 minutes  This is patient's second [facility] appointment. Reason for Consult:  depression  Referring Provider: MD Jordan Kay 150  [address] [facility], [address] [place] [address]      S:  Pt reported that she's been feeling better lately. Believes meds have been helpful. Also feeling motivated by [facility] services, even though she hasn't met all of her goals. Unsure what's keeping her from getting started with exercising. Got a CPAP machine. Pt has been sleeping on her couch for a few years, but has recently transitioned back to her bed, which she thinks is helpful. Trying to work a schedule back into her days. Trying to take melatonin at 10:30pm to help her get to bed by midnight. Hasn't been napping as often. Feels good that her sons are stable right now. Struggles to remain a supportive parent while not smothering them. Some difficulty reconciling her feelings about challenges they faced during their childhood.        O:  MSE:  Appearance: good hygiene and appropriate attire  Attitude: cooperative, friendly, tearful and moderate distress  Consciousness: alert  Orientation: oriented to person, place, time, general circumstance  Memory: recent and remote memory intact  Attention/Concentration: intact during session  Psychomotor Activity: normal  Eye Contact: normal  Speech: normal rate and volume, well-articulated, verbose  Mood: mildly dyshporic and anxious  Affect: congruent with thought content and mood, brighter today  Perception: within normal limits  Thought Content: within normal limits   Thought Process: logical, coherent, tangential and circumstantial at times, some difficulty redirecting  Insight: improving  Judgment: intact  Morbid ideation: no  Suicide Assessment: no suicidal ideation      History:    Medications:   Current Outpatient Prescriptions   Medication Sig Dispense Refill    smoked. She has never used smokeless tobacco.  ETOH:   reports that she drinks alcohol. Family History:   Family History   Problem Relation Age of Onset    Diabetes Mother     Osteoporosis Mother     High Cholesterol Father        A:  Pt's distress has decreased since last visit. Benefiting from medications and increased focus on self-care. Reinforced positive changes related to sleep. Explored barriers to getting started with exercise, which resulted in pt repeatedly shifting her focus to her sons. Highlighted pt's tendency to focus on them as a means of avoiding focus on herself, which she acknowledged. Pt expressed interest in focusing more on coping strategies for manage depression (rather than the reasons she feels depressed). Wants more info on mindfulness including apps she can use, which were provided. Provided handout on sleep hygiene and stimulus control, diaphragmatic breathing, grounding, and relaxation apps. No safety concerns at this time. VIJAYA 7 SCORE 10/30/2017 10/9/2017   VIJAYA-7 Total Score 8 7     Interpretation of VIJAYA-7 score: 5-9 = mild anxiety, 10-14 = moderate anxiety, 15+ = severe anxiety. Recommend referral to behavioral health for scores 10 or greater. PHQ Scores 10/30/2017 10/9/2017 8/23/2017   PHQ2 Score 2 4 0   PHQ9 Score 8 19 0     Interpretation of Total Score Depression Severity: 1-4 = Minimal depression, 5-9 = Mild depression, 10-14 = Moderate depression, 15-19 = Moderately severe depression, 20-27 = Severe depression    Diagnosis:    1.  Major depressive disorder, recurrent episode, moderate with anxious distress [facility])        Patient Active Problem List   Diagnosis    Attention deficit disorder    Sleep apnea    Hypothyroidism    Depression    Binge eating disorder    Major depressive disorder, recurrent episode, moderate with anxious distress ([facility] [address]         Plan:  Pt interventions:  Supportive techniques, Emphasized self-care as important for managing overall health,

## 2017-11-15 ENCOUNTER — OFFICE VISIT (OUTPATIENT)
Dept: FAMILY MEDICINE CLINIC | Age: 57
End: 2017-11-15

## 2017-11-15 VITALS
BODY MASS INDEX: 44.26 KG/M2 | TEMPERATURE: 97.7 F | HEART RATE: 90 BPM | WEIGHT: 242 LBS | OXYGEN SATURATION: 95 % | RESPIRATION RATE: 20 BRPM | DIASTOLIC BLOOD PRESSURE: 78 MMHG | SYSTOLIC BLOOD PRESSURE: 134 MMHG

## 2017-11-15 DIAGNOSIS — F33.1 MAJOR DEPRESSIVE DISORDER, RECURRENT EPISODE, MODERATE WITH ANXIOUS DISTRESS (HCC): Primary | ICD-10-CM

## 2017-11-15 DIAGNOSIS — F50.81 BINGE EATING DISORDER: ICD-10-CM

## 2017-11-15 DIAGNOSIS — F98.8 ATTENTION DEFICIT DISORDER (ADD) WITHOUT HYPERACTIVITY: ICD-10-CM

## 2017-11-15 PROCEDURE — 99214 OFFICE O/P EST MOD 30 MIN: CPT | Performed by: FAMILY MEDICINE

## 2017-11-15 NOTE — PROGRESS NOTES
Subjective:      Patient ID: Kathy Singh is a 56 y.o. female. HPI  Avani Singh is here for follow up on ADD, chance eating and depression. She feels the Pristiq is working well. She feels more level, mood more positive. No anhedonia, appetite is fine. Sleep is improved. Not suicidal.  Saw Dr. Danny Deleon a few times. ADD/ADHD:  Current treatment: Vyvanse- 50 mg, which has been effective. Residual symptoms: inattention. Medication side effects: None. Patient denies anorexia, palpitations, insomnia, irritability and anxiety. She thinks the Vyvanse helps her to eat a bit less but she feels she is not putting as much effort in to weight loss as she should. She is not binging since on Vyvanse. Is not exercising. Outpatient Prescriptions Marked as Taking for the 11/15/17 encounter (Office Visit) with Cassandra Alexis MD   Medication Sig Dispense Refill    buPROPion (WELLBUTRIN XL) 300 MG extended release tablet Take 1 tablet by mouth every morning 30 tablet 5    desvenlafaxine succinate (PRISTIQ) 50 MG TB24 extended release tablet Take 1 tablet by mouth daily 30 tablet 3    lisdexamfetamine (VYVANSE) 50 MG capsule Take 1 capsule by mouth every morning . 30 capsule 0    levothyroxine (SYNTHROID) 150 MCG tablet Take 1 tablet by mouth daily 90 tablet 1    Omega-3 Fatty Acids (FISH OIL ADULT GUMMIES PO) Take by mouth      melatonin 3 MG TABS tablet Take 3 mg by mouth daily      BIOTIN PO Take by mouth      docusate sodium (COLACE) 100 MG capsule Take 100 mg by mouth daily.  FIBER, CORN DEXTRIN, PO Take 1 tablet by mouth daily.  Magnesium 500 MG CAPS Take 1 tablet by mouth daily.  calcium citrate-vitamin D (CITRICAL + D) 315-250 MG-UNIT TABS Take  by mouth.  vitamin B-12 (CYANOCOBALAMIN) 500 MCG tablet Take 500 mcg by mouth daily.  therapeutic multivitamin-minerals (THERAGRAN-M) tablet Take 1 tablet by mouth daily.         Patient Active Problem List   Diagnosis    Attention deficit disorder    Sleep apnea    Hypothyroidism    Depression    Binge eating disorder    Major depressive disorder, recurrent episode, moderate with anxious distress (HCC)      PMH, PSH, SH, Problem list reviewed. Social History   Substance Use Topics    Smoking status: Never Smoker    Smokeless tobacco: Never Used    Alcohol use 0.0 oz/week      Comment: infrequent      Allergies   Allergen Reactions    Latex Rash    Penicillins     Cefdinir Other (See Comments)     dysphagia      Review of Systems    Objective:   Physical Exam  NAD  Skin is warm and dry. Mood and affect are normal.  No agitation or psychomotor retardation. No pressured speech, grandiosity or tangential thoughts. Insight and judgement are intact. Not suicidal.   The neck is supple and free of adenopathy or masses, the thyroid is normal without enlargement or nodules. Chest is clear, no wheezing or rales. Normal symmetric air entry throughout both lung fields. Heart regular with normal rate, no murmer or gallop     Assessment:      1. Major depressive disorder, recurrent episode, moderate with anxious distress ([address]  Well controlled with Pristiq   2. Attention deficit disorder (ADD) without hyperactivity  lisdexamfetamine (VYVANSE) 50 MG capsule    lisdexamfetamine (VYVANSE) 50 MG capsule    lisdexamfetamine (VYVANSE) 50 MG capsule   3. Binge eating disorder  lisdexamfetamine (VYVANSE) 50 MG capsule    lisdexamfetamine (VYVANSE) 50 MG capsule    lisdexamfetamine (VYVANSE) 50 MG capsule  Controlled Substances Monitoring:     Documentation: Possible medication side effects, risk of tolerance and/or dependence, and alternative treatments discussed., No signs of potential drug abuse or diversion identified. Raegan Abdi MD)           Plan:      Discussed diet, exercise and weight loss strategies  Follow up in 3 months or prn.

## 2018-01-10 ENCOUNTER — TELEPHONE (OUTPATIENT)
Dept: FAMILY MEDICINE CLINIC | Age: 58
End: 2018-01-10

## 2018-01-10 NOTE — TELEPHONE ENCOUNTER
THE PA HAS BEEN APPROVED VIA [facility]  CASE ID [id number]  START DATE 1/1/18  END DATE   1/31/19

## 2018-01-11 ENCOUNTER — OFFICE VISIT (OUTPATIENT)
Dept: PULMONOLOGY | Age: 58
End: 2018-01-11

## 2018-01-11 VITALS
BODY MASS INDEX: 44.35 KG/M2 | WEIGHT: 241 LBS | HEIGHT: 62 IN | DIASTOLIC BLOOD PRESSURE: 70 MMHG | SYSTOLIC BLOOD PRESSURE: 130 MMHG | HEART RATE: 98 BPM | OXYGEN SATURATION: 97 %

## 2018-01-11 DIAGNOSIS — E66.01 MORBID OBESITY, UNSPECIFIED OBESITY TYPE (HCC): Chronic | ICD-10-CM

## 2018-01-11 DIAGNOSIS — F32.A DEPRESSION, UNSPECIFIED DEPRESSION TYPE: Chronic | ICD-10-CM

## 2018-01-11 DIAGNOSIS — G47.33 OBSTRUCTIVE SLEEP APNEA SYNDROME: Primary | Chronic | ICD-10-CM

## 2018-01-11 DIAGNOSIS — E03.9 ACQUIRED HYPOTHYROIDISM: Chronic | ICD-10-CM

## 2018-01-11 PROCEDURE — 99214 OFFICE O/P EST MOD 30 MIN: CPT | Performed by: NURSE PRACTITIONER

## 2018-01-11 ASSESSMENT — ENCOUNTER SYMPTOMS
APNEA: 0
RHINORRHEA: 0
SHORTNESS OF BREATH: 0
ABDOMINAL DISTENTION: 0
ABDOMINAL PAIN: 0
COUGH: 0
SINUS PRESSURE: 0

## 2018-01-11 ASSESSMENT — SLEEP AND FATIGUE QUESTIONNAIRES
ESS TOTAL SCORE: 12
HOW LIKELY ARE YOU TO NOD OFF OR FALL ASLEEP WHILE WATCHING TV: 1
HOW LIKELY ARE YOU TO NOD OFF OR FALL ASLEEP IN A CAR, WHILE STOPPED FOR A FEW MINUTES IN TRAFFIC: 1
HOW LIKELY ARE YOU TO NOD OFF OR FALL ASLEEP WHILE SITTING AND TALKING TO SOMEONE: 1
HOW LIKELY ARE YOU TO NOD OFF OR FALL ASLEEP WHILE LYING DOWN TO REST IN THE AFTERNOON WHEN CIRCUMSTANCES PERMIT: 3
HOW LIKELY ARE YOU TO NOD OFF OR FALL ASLEEP WHILE SITTING AND READING: 1
HOW LIKELY ARE YOU TO NOD OFF OR FALL ASLEEP WHILE SITTING INACTIVE IN A PUBLIC PLACE: 1
HOW LIKELY ARE YOU TO NOD OFF OR FALL ASLEEP WHEN YOU ARE A PASSENGER IN A CAR FOR AN HOUR WITHOUT A BREAK: 3
HOW LIKELY ARE YOU TO NOD OFF OR FALL ASLEEP WHILE SITTING QUIETLY AFTER LUNCH WITHOUT ALCOHOL: 1

## 2018-02-21 DIAGNOSIS — F50.81 BINGE EATING DISORDER: ICD-10-CM

## 2018-02-21 DIAGNOSIS — F98.8 ATTENTION DEFICIT DISORDER (ADD) WITHOUT HYPERACTIVITY: ICD-10-CM

## 2018-02-21 NOTE — TELEPHONE ENCOUNTER
From: Víctor Saldaña  Sent: 2/21/2018 8:19 AM EST  Subject: Medication Renewal Request    Toan Machado would like a refill of the following medications:  lisdexamfetamine (VYVANSE) 50 MG capsule Jovon Patrick MD]    Preferred pharmacy: [address] [place] [address], [address] [place] [address] [phone] - F [phone]    Comment:  I am currently in [place], [place] and am uncertain of my return date. I do not have any refills available for Vyvanse 50 mg capsules, and just have 1 more daily dosage. I cannot use the designated [facility] in [place], since I am in [place]. Could a 30-day script be electronically sent to the following Pharmacy? [facility], #[id number], [address] Phone: [phone] They are open 24-hrs/7 days a week. I will be in contact with your office to set up an appt ASAP. Thanks!  Melania Harris

## 2018-03-02 ENCOUNTER — TELEPHONE (OUTPATIENT)
Dept: SLEEP MEDICINE | Age: 58
End: 2018-03-02

## 2018-03-08 ENCOUNTER — OFFICE VISIT (OUTPATIENT)
Dept: FAMILY MEDICINE CLINIC | Age: 58
End: 2018-03-08

## 2018-03-08 VITALS
RESPIRATION RATE: 20 BRPM | BODY MASS INDEX: 43.57 KG/M2 | HEART RATE: 87 BPM | SYSTOLIC BLOOD PRESSURE: 126 MMHG | WEIGHT: 238.2 LBS | DIASTOLIC BLOOD PRESSURE: 78 MMHG | TEMPERATURE: 97.4 F | OXYGEN SATURATION: 96 %

## 2018-03-08 DIAGNOSIS — F98.8 ATTENTION DEFICIT DISORDER (ADD) WITHOUT HYPERACTIVITY: Primary | ICD-10-CM

## 2018-03-08 DIAGNOSIS — F32.A DEPRESSION, UNSPECIFIED DEPRESSION TYPE: ICD-10-CM

## 2018-03-08 DIAGNOSIS — E03.9 ACQUIRED HYPOTHYROIDISM: ICD-10-CM

## 2018-03-08 DIAGNOSIS — Z00.00 WELL ADULT EXAM: ICD-10-CM

## 2018-03-08 DIAGNOSIS — F50.81 BINGE EATING DISORDER: ICD-10-CM

## 2018-03-08 LAB
A/G RATIO: 1.6 (ref 1.1–2.2)
ALBUMIN SERPL-MCNC: 4.6 G/DL (ref 3.4–5)
ALP BLD-CCNC: 102 U/L (ref 40–129)
ALT SERPL-CCNC: 34 U/L (ref 10–40)
ANION GAP SERPL CALCULATED.3IONS-SCNC: 11 MMOL/L (ref 3–16)
AST SERPL-CCNC: 23 U/L (ref 15–37)
BILIRUB SERPL-MCNC: <0.2 MG/DL (ref 0–1)
BUN BLDV-MCNC: 15 MG/DL (ref 7–20)
CALCIUM SERPL-MCNC: 9.8 MG/DL (ref 8.3–10.6)
CHLORIDE BLD-SCNC: 98 MMOL/L (ref 99–110)
CHOLESTEROL, TOTAL: 174 MG/DL (ref 0–199)
CO2: 29 MMOL/L (ref 21–32)
CREAT SERPL-MCNC: 0.6 MG/DL (ref 0.6–1.1)
GFR AFRICAN AMERICAN: >60
GFR NON-AFRICAN AMERICAN: >60
GLOBULIN: 2.9 G/DL
GLUCOSE BLD-MCNC: 110 MG/DL (ref 70–99)
HDLC SERPL-MCNC: 70 MG/DL (ref 40–60)
LDL CHOLESTEROL CALCULATED: 76 MG/DL
POTASSIUM SERPL-SCNC: 4.5 MMOL/L (ref 3.5–5.1)
SODIUM BLD-SCNC: 138 MMOL/L (ref 136–145)
TOTAL PROTEIN: 7.5 G/DL (ref 6.4–8.2)
TRIGL SERPL-MCNC: 141 MG/DL (ref 0–150)
TSH REFLEX: 0.98 UIU/ML (ref 0.27–4.2)
VLDLC SERPL CALC-MCNC: 28 MG/DL

## 2018-03-08 PROCEDURE — 99214 OFFICE O/P EST MOD 30 MIN: CPT | Performed by: FAMILY MEDICINE

## 2018-03-08 RX ORDER — LEVOTHYROXINE SODIUM 0.15 MG/1
TABLET ORAL
Qty: 90 TABLET | Refills: 3 | Status: SHIPPED | OUTPATIENT
Start: 2018-03-08 | End: 2018-11-14 | Stop reason: SDUPTHER

## 2018-03-08 RX ORDER — BUPROPION HYDROCHLORIDE 300 MG/1
300 TABLET ORAL EVERY MORNING
Qty: 90 TABLET | Refills: 3 | Status: SHIPPED | OUTPATIENT
Start: 2018-03-08 | End: 2018-10-22 | Stop reason: SDUPTHER

## 2018-04-11 ENCOUNTER — OFFICE VISIT (OUTPATIENT)
Dept: FAMILY MEDICINE CLINIC | Age: 58
End: 2018-04-11

## 2018-04-11 ENCOUNTER — OFFICE VISIT (OUTPATIENT)
Dept: PULMONOLOGY | Age: 58
End: 2018-04-11

## 2018-04-11 VITALS
BODY MASS INDEX: 45.2 KG/M2 | SYSTOLIC BLOOD PRESSURE: 138 MMHG | HEART RATE: 102 BPM | WEIGHT: 239.4 LBS | DIASTOLIC BLOOD PRESSURE: 76 MMHG | HEIGHT: 61 IN | OXYGEN SATURATION: 97 %

## 2018-04-11 VITALS
BODY MASS INDEX: 45.5 KG/M2 | OXYGEN SATURATION: 95 % | HEIGHT: 61 IN | HEART RATE: 85 BPM | TEMPERATURE: 96.9 F | SYSTOLIC BLOOD PRESSURE: 124 MMHG | DIASTOLIC BLOOD PRESSURE: 56 MMHG | RESPIRATION RATE: 20 BRPM | WEIGHT: 241 LBS

## 2018-04-11 DIAGNOSIS — R92.8 MAMMOGRAM ABNORMAL: ICD-10-CM

## 2018-04-11 DIAGNOSIS — G47.33 OBSTRUCTIVE SLEEP APNEA SYNDROME: Primary | Chronic | ICD-10-CM

## 2018-04-11 DIAGNOSIS — E03.9 ACQUIRED HYPOTHYROIDISM: Chronic | ICD-10-CM

## 2018-04-11 DIAGNOSIS — F32.A DEPRESSION, UNSPECIFIED DEPRESSION TYPE: Chronic | ICD-10-CM

## 2018-04-11 DIAGNOSIS — Z00.00 ANNUAL PHYSICAL EXAM: Primary | ICD-10-CM

## 2018-04-11 DIAGNOSIS — F50.81 BINGE EATING DISORDER: ICD-10-CM

## 2018-04-11 DIAGNOSIS — Z01.419 ENCOUNTER FOR GYNECOLOGICAL EXAMINATION WITHOUT ABNORMAL FINDING: ICD-10-CM

## 2018-04-11 DIAGNOSIS — F98.8 ATTENTION DEFICIT DISORDER (ADD) WITHOUT HYPERACTIVITY: ICD-10-CM

## 2018-04-11 DIAGNOSIS — R73.9 HYPERGLYCEMIA: ICD-10-CM

## 2018-04-11 DIAGNOSIS — E66.01 MORBID OBESITY, UNSPECIFIED OBESITY TYPE (HCC): Chronic | ICD-10-CM

## 2018-04-11 PROCEDURE — 99214 OFFICE O/P EST MOD 30 MIN: CPT | Performed by: NURSE PRACTITIONER

## 2018-04-11 PROCEDURE — 99396 PREV VISIT EST AGE 40-64: CPT | Performed by: FAMILY MEDICINE

## 2018-04-11 ASSESSMENT — SLEEP AND FATIGUE QUESTIONNAIRES
HOW LIKELY ARE YOU TO NOD OFF OR FALL ASLEEP WHILE SITTING QUIETLY AFTER LUNCH WITHOUT ALCOHOL: 1
HOW LIKELY ARE YOU TO NOD OFF OR FALL ASLEEP IN A CAR, WHILE STOPPED FOR A FEW MINUTES IN TRAFFIC: 1
HOW LIKELY ARE YOU TO NOD OFF OR FALL ASLEEP WHILE WATCHING TV: 1
ESS TOTAL SCORE: 12
HOW LIKELY ARE YOU TO NOD OFF OR FALL ASLEEP WHILE SITTING AND TALKING TO SOMEONE: 1
HOW LIKELY ARE YOU TO NOD OFF OR FALL ASLEEP WHILE SITTING INACTIVE IN A PUBLIC PLACE: 1
HOW LIKELY ARE YOU TO NOD OFF OR FALL ASLEEP WHILE SITTING AND READING: 1
HOW LIKELY ARE YOU TO NOD OFF OR FALL ASLEEP WHILE LYING DOWN TO REST IN THE AFTERNOON WHEN CIRCUMSTANCES PERMIT: 3
HOW LIKELY ARE YOU TO NOD OFF OR FALL ASLEEP WHEN YOU ARE A PASSENGER IN A CAR FOR AN HOUR WITHOUT A BREAK: 3

## 2018-04-11 ASSESSMENT — ENCOUNTER SYMPTOMS
RHINORRHEA: 0
COUGH: 0
APNEA: 0
SHORTNESS OF BREATH: 0
ABDOMINAL PAIN: 0
ABDOMINAL DISTENTION: 0
SINUS PRESSURE: 0

## 2018-04-13 LAB
HPV COMMENT: NORMAL
HPV TYPE 16: NOT DETECTED
HPV TYPE 18: NOT DETECTED
HPVOH (OTHER TYPES): NOT DETECTED

## 2018-05-11 PROBLEM — Z00.00 ANNUAL PHYSICAL EXAM: Status: RESOLVED | Noted: 2018-04-11 | Resolved: 2018-05-11

## 2018-05-11 PROBLEM — Z01.419 ENCOUNTER FOR GYNECOLOGICAL EXAMINATION WITHOUT ABNORMAL FINDING: Status: RESOLVED | Noted: 2018-04-11 | Resolved: 2018-05-11

## 2018-05-25 DIAGNOSIS — F50.81 BINGE EATING DISORDER: ICD-10-CM

## 2018-05-25 DIAGNOSIS — F98.8 ATTENTION DEFICIT DISORDER (ADD) WITHOUT HYPERACTIVITY: ICD-10-CM

## 2018-06-12 ENCOUNTER — OFFICE VISIT (OUTPATIENT)
Dept: FAMILY MEDICINE CLINIC | Age: 58
End: 2018-06-12

## 2018-06-12 VITALS
TEMPERATURE: 97.7 F | BODY MASS INDEX: 44.44 KG/M2 | DIASTOLIC BLOOD PRESSURE: 73 MMHG | HEART RATE: 100 BPM | SYSTOLIC BLOOD PRESSURE: 132 MMHG | RESPIRATION RATE: 20 BRPM | OXYGEN SATURATION: 97 % | WEIGHT: 235.2 LBS

## 2018-06-12 DIAGNOSIS — R73.9 HYPERGLYCEMIA: ICD-10-CM

## 2018-06-12 DIAGNOSIS — F33.1 MODERATE EPISODE OF RECURRENT MAJOR DEPRESSIVE DISORDER (HCC): Chronic | ICD-10-CM

## 2018-06-12 DIAGNOSIS — F98.8 ATTENTION DEFICIT DISORDER (ADD) WITHOUT HYPERACTIVITY: ICD-10-CM

## 2018-06-12 DIAGNOSIS — F50.81 BINGE EATING DISORDER: ICD-10-CM

## 2018-06-12 PROCEDURE — 99214 OFFICE O/P EST MOD 30 MIN: CPT | Performed by: FAMILY MEDICINE

## 2018-06-12 RX ORDER — DESVENLAFAXINE 50 MG/1
TABLET, EXTENDED RELEASE ORAL
Qty: 90 TABLET | Refills: 1 | Status: SHIPPED | OUTPATIENT
Start: 2018-06-12 | End: 2018-09-13 | Stop reason: SDUPTHER

## 2018-06-12 ASSESSMENT — ENCOUNTER SYMPTOMS: SHORTNESS OF BREATH: 0

## 2018-06-13 PROBLEM — R73.9 HYPERGLYCEMIA: Status: RESOLVED | Noted: 2018-04-11 | Resolved: 2018-06-13

## 2018-06-13 PROBLEM — R73.03 PRE-DIABETES: Status: ACTIVE | Noted: 2018-06-13

## 2018-06-13 LAB
ANION GAP SERPL CALCULATED.3IONS-SCNC: 16 MMOL/L (ref 3–16)
BUN BLDV-MCNC: 19 MG/DL (ref 7–20)
CALCIUM SERPL-MCNC: 10.1 MG/DL (ref 8.3–10.6)
CHLORIDE BLD-SCNC: 99 MMOL/L (ref 99–110)
CO2: 25 MMOL/L (ref 21–32)
CREAT SERPL-MCNC: 0.8 MG/DL (ref 0.6–1.1)
ESTIMATED AVERAGE GLUCOSE: 125.5 MG/DL
GFR AFRICAN AMERICAN: >60
GFR NON-AFRICAN AMERICAN: >60
GLUCOSE BLD-MCNC: 133 MG/DL (ref 70–99)
HBA1C MFR BLD: 6 %
POTASSIUM SERPL-SCNC: 4.1 MMOL/L (ref 3.5–5.1)
SODIUM BLD-SCNC: 140 MMOL/L (ref 136–145)

## 2018-07-24 DIAGNOSIS — F98.8 ATTENTION DEFICIT DISORDER (ADD) WITHOUT HYPERACTIVITY: ICD-10-CM

## 2018-07-24 DIAGNOSIS — F50.81 BINGE EATING DISORDER: ICD-10-CM

## 2018-07-24 NOTE — TELEPHONE ENCOUNTER
From: Molly Harper  Sent: 7/24/2018 12:01 PM EDT  Subject: Medication Renewal Request    Jon Westfall would like a refill of the following medications:     Lisdexamfetamine Dimesylate (VYVANSE) 40 MG CAPS Gaurav Penny MD]   Patient Comment: Please send Rx to [facility] [place] Thanks!     Preferred pharmacy: [facility]/PHARMACY [address] - F [phone]

## 2018-08-21 DIAGNOSIS — F50.81 BINGE EATING DISORDER: ICD-10-CM

## 2018-08-21 DIAGNOSIS — F98.8 ATTENTION DEFICIT DISORDER (ADD) WITHOUT HYPERACTIVITY: ICD-10-CM

## 2018-08-21 NOTE — TELEPHONE ENCOUNTER
From: Brain Enmanuel  Sent: 8/21/2018 7:42 AM EDT  Subject: Medication Renewal Request    Merly Ruiz would like a refill of the following medications:     Lisdexamfetamine Dimesylate (VYVANSE) 40 MG CAPS Alexa Pakr MD]    Preferred pharmacy: [address] [phone] - F [phone]

## 2018-09-13 ENCOUNTER — OFFICE VISIT (OUTPATIENT)
Dept: FAMILY MEDICINE CLINIC | Age: 58
End: 2018-09-13

## 2018-09-13 VITALS
RESPIRATION RATE: 12 BRPM | TEMPERATURE: 97.6 F | BODY MASS INDEX: 43.72 KG/M2 | HEART RATE: 89 BPM | SYSTOLIC BLOOD PRESSURE: 125 MMHG | OXYGEN SATURATION: 97 % | WEIGHT: 231.4 LBS | DIASTOLIC BLOOD PRESSURE: 73 MMHG

## 2018-09-13 DIAGNOSIS — F98.8 ATTENTION DEFICIT DISORDER (ADD) WITHOUT HYPERACTIVITY: Chronic | ICD-10-CM

## 2018-09-13 DIAGNOSIS — M76.32 ILIOTIBIAL BAND SYNDROME OF LEFT SIDE: ICD-10-CM

## 2018-09-13 DIAGNOSIS — F33.1 MAJOR DEPRESSIVE DISORDER, RECURRENT EPISODE, MODERATE WITH ANXIOUS DISTRESS (HCC): Primary | ICD-10-CM

## 2018-09-13 DIAGNOSIS — R73.03 PRE-DIABETES: ICD-10-CM

## 2018-09-13 DIAGNOSIS — Z23 NEED FOR INFLUENZA VACCINATION: ICD-10-CM

## 2018-09-13 DIAGNOSIS — F50.81 BINGE EATING DISORDER: ICD-10-CM

## 2018-09-13 PROCEDURE — 99214 OFFICE O/P EST MOD 30 MIN: CPT | Performed by: FAMILY MEDICINE

## 2018-09-13 PROCEDURE — 90471 IMMUNIZATION ADMIN: CPT | Performed by: FAMILY MEDICINE

## 2018-09-13 PROCEDURE — 90686 IIV4 VACC NO PRSV 0.5 ML IM: CPT | Performed by: FAMILY MEDICINE

## 2018-09-13 RX ORDER — DESVENLAFAXINE 50 MG/1
TABLET, EXTENDED RELEASE ORAL
Qty: 90 TABLET | Refills: 1 | Status: SHIPPED | OUTPATIENT
Start: 2018-09-13 | End: 2018-10-23 | Stop reason: SDUPTHER

## 2018-09-13 RX ORDER — ACETAMINOPHEN 160 MG
TABLET,DISINTEGRATING ORAL
COMMUNITY

## 2018-09-13 RX ORDER — FOLIC ACID 1 MG/1
1 TABLET ORAL DAILY
COMMUNITY

## 2018-09-13 ASSESSMENT — ENCOUNTER SYMPTOMS
COUGH: 0
CHEST TIGHTNESS: 0
SHORTNESS OF BREATH: 0

## 2018-09-13 NOTE — PROGRESS NOTES
.Vaccine Information Sheet, \"Influenza - Inactivated\"  given to Larry Hernandez, or parent/legal guardian of  Larry Hernandez and verbalized understanding. Patient responses:    Have you ever had a reaction to a flu vaccine? No  Are you able to eat eggs without adverse effects? Yes  Do you have any current illness? No  Have you ever had [place] Syndrome? No    Flu vaccine given per order. Please see immunization tab. Current Influenza vaccine VIS given to patient. Influenza consent form/questionnaire completed and signed. Patient responses to  Influenza consent form / questionnaire  reviewed. Vaccine given per protocol.
blum. Heart regular with normal rate, no murmer or gallop     + tenderness left lateral hip and thigh. Full AROM hips and knees. No joint swelling or effusion. Motor 5/5 hip flexors, flexion and extension knees, dorsiflexion and plantar flexion feet. DTR 2/4 patella and Achilles tendons. Gait is nromal.  Assessment:       Diagnosis Orders   1. Major depressive disorder, recurrent episode, moderate with anxious distress ([address]  Well controlled continue Prisitq   2. Attention deficit disorder (ADD) without hyperactivity  Lisdexamfetamine Dimesylate (VYVANSE) 40 MG CAPS  Well controlled   3. Binge eating disorder  Lisdexamfetamine Dimesylate (VYVANSE) 40 MG CAPS  Weight slowly improving; Discussed diet, exercise and weight loss strategies    4. Pre-diabetes  Discussed diet, exercise and weight loss strategies   Consider metformin; she prefers diet and exercise for now        6. Need for influenza vaccination  INFLUENZA, QUADV, 3 YRS AND OLDER, IM, PF, PREFILL SYR OR SDV, 0.5ML (FLUZONE QUADV, PF)    7. IT tendonitis. - Exercises and informational handout reviewed and copy provided. Plan:      Side effects of current medications reviewed and questions answered. Follow up in 3 months or prn.

## 2018-10-23 DIAGNOSIS — F50.81 BINGE EATING DISORDER: ICD-10-CM

## 2018-10-23 DIAGNOSIS — F98.8 ATTENTION DEFICIT DISORDER (ADD) WITHOUT HYPERACTIVITY: Chronic | ICD-10-CM

## 2018-10-23 DIAGNOSIS — F33.1 MAJOR DEPRESSIVE DISORDER, RECURRENT EPISODE, MODERATE WITH ANXIOUS DISTRESS (HCC): ICD-10-CM

## 2018-10-23 RX ORDER — DESVENLAFAXINE 50 MG/1
TABLET, EXTENDED RELEASE ORAL
Qty: 90 TABLET | Refills: 1 | Status: SHIPPED | OUTPATIENT
Start: 2018-10-23 | End: 2019-03-08 | Stop reason: SDUPTHER

## 2018-11-08 ENCOUNTER — TELEPHONE (OUTPATIENT)
Dept: FAMILY MEDICINE CLINIC | Age: 58
End: 2018-11-08

## 2018-11-08 NOTE — TELEPHONE ENCOUNTER
Pt stated the following:  [place]'t [place] in town 12/6/18. Pt requesting an appointment 11/14/18.    Please call

## 2018-11-14 ENCOUNTER — OFFICE VISIT (OUTPATIENT)
Dept: FAMILY MEDICINE CLINIC | Age: 58
End: 2018-11-14
Payer: COMMERCIAL

## 2018-11-14 VITALS
HEART RATE: 86 BPM | OXYGEN SATURATION: 95 % | SYSTOLIC BLOOD PRESSURE: 134 MMHG | WEIGHT: 232.6 LBS | RESPIRATION RATE: 14 BRPM | BODY MASS INDEX: 43.95 KG/M2 | TEMPERATURE: 97.5 F | DIASTOLIC BLOOD PRESSURE: 81 MMHG

## 2018-11-14 DIAGNOSIS — E03.9 ACQUIRED HYPOTHYROIDISM: ICD-10-CM

## 2018-11-14 DIAGNOSIS — F98.8 ATTENTION DEFICIT DISORDER (ADD) WITHOUT HYPERACTIVITY: Primary | Chronic | ICD-10-CM

## 2018-11-14 DIAGNOSIS — F33.1 MAJOR DEPRESSIVE DISORDER, RECURRENT EPISODE, MODERATE WITH ANXIOUS DISTRESS (HCC): ICD-10-CM

## 2018-11-14 DIAGNOSIS — F50.81 BINGE EATING DISORDER: ICD-10-CM

## 2018-11-14 PROCEDURE — 99213 OFFICE O/P EST LOW 20 MIN: CPT | Performed by: FAMILY MEDICINE

## 2018-11-14 RX ORDER — LEVOTHYROXINE SODIUM 0.15 MG/1
TABLET ORAL
Qty: 90 TABLET | Refills: 3 | Status: SHIPPED | OUTPATIENT
Start: 2018-11-14 | End: 2018-11-23 | Stop reason: SDUPTHER

## 2018-11-23 DIAGNOSIS — E03.9 ACQUIRED HYPOTHYROIDISM: ICD-10-CM

## 2018-11-23 RX ORDER — LEVOTHYROXINE SODIUM 0.15 MG/1
TABLET ORAL
Qty: 90 TABLET | Refills: 3 | Status: SHIPPED | OUTPATIENT
Start: 2018-11-23 | End: 2019-01-08 | Stop reason: SDUPTHER

## 2018-11-23 NOTE — TELEPHONE ENCOUNTER
[address] is requesting    LEVOTHYROXINE 150mg    USO:43/21/1721  LAB: 06/12/2018    Please Advise

## 2018-12-26 DIAGNOSIS — F50.81 BINGE EATING DISORDER: ICD-10-CM

## 2018-12-26 DIAGNOSIS — F98.8 ATTENTION DEFICIT DISORDER (ADD) WITHOUT HYPERACTIVITY: Chronic | ICD-10-CM

## 2018-12-26 NOTE — TELEPHONE ENCOUNTER
From: Daksha Hall  Sent: 12/24/2018 4:52 PM EST  Subject: Medication Renewal Request    Cinthya Collins would like a refill of the following medications:     Lisdexamfetamine Dimesylate (VYVANSE) 40 MG CAPS Carlo Gatica MD]   Patient Comment: Hi!  Please send refill approval to [facility]    Preferred pharmacy: [facility]/PHARMACY [address] - F [phone]

## 2019-01-25 DIAGNOSIS — F98.8 ATTENTION DEFICIT DISORDER (ADD) WITHOUT HYPERACTIVITY: Chronic | ICD-10-CM

## 2019-01-25 DIAGNOSIS — F50.81 BINGE EATING DISORDER: ICD-10-CM

## 2019-02-21 DIAGNOSIS — F50.81 BINGE EATING DISORDER: ICD-10-CM

## 2019-02-21 DIAGNOSIS — F98.8 ATTENTION DEFICIT DISORDER (ADD) WITHOUT HYPERACTIVITY: Chronic | ICD-10-CM

## 2019-03-08 ENCOUNTER — OFFICE VISIT (OUTPATIENT)
Dept: FAMILY MEDICINE CLINIC | Age: 59
End: 2019-03-08

## 2019-03-08 VITALS
BODY MASS INDEX: 43.61 KG/M2 | HEIGHT: 62 IN | SYSTOLIC BLOOD PRESSURE: 133 MMHG | DIASTOLIC BLOOD PRESSURE: 86 MMHG | TEMPERATURE: 97.3 F | RESPIRATION RATE: 12 BRPM | WEIGHT: 237 LBS | HEART RATE: 97 BPM | OXYGEN SATURATION: 96 %

## 2019-03-08 DIAGNOSIS — F33.1 MAJOR DEPRESSIVE DISORDER, RECURRENT EPISODE, MODERATE WITH ANXIOUS DISTRESS (HCC): ICD-10-CM

## 2019-03-08 DIAGNOSIS — E03.9 ACQUIRED HYPOTHYROIDISM: Chronic | ICD-10-CM

## 2019-03-08 DIAGNOSIS — F98.8 ATTENTION DEFICIT DISORDER (ADD) WITHOUT HYPERACTIVITY: Primary | Chronic | ICD-10-CM

## 2019-03-08 DIAGNOSIS — G47.33 OBSTRUCTIVE SLEEP APNEA SYNDROME: Chronic | ICD-10-CM

## 2019-03-08 DIAGNOSIS — F50.81 BINGE EATING DISORDER: ICD-10-CM

## 2019-03-08 DIAGNOSIS — R73.03 PRE-DIABETES: ICD-10-CM

## 2019-03-08 LAB
A/G RATIO: 1.5 (ref 1.1–2.2)
ALBUMIN SERPL-MCNC: 4.6 G/DL (ref 3.4–5)
ALP BLD-CCNC: 111 U/L (ref 40–129)
ALT SERPL-CCNC: 38 U/L (ref 10–40)
ANION GAP SERPL CALCULATED.3IONS-SCNC: 16 MMOL/L (ref 3–16)
AST SERPL-CCNC: 26 U/L (ref 15–37)
BILIRUB SERPL-MCNC: <0.2 MG/DL (ref 0–1)
BUN BLDV-MCNC: 20 MG/DL (ref 7–20)
CALCIUM SERPL-MCNC: 10.4 MG/DL (ref 8.3–10.6)
CHLORIDE BLD-SCNC: 102 MMOL/L (ref 99–110)
CHOLESTEROL, TOTAL: 213 MG/DL (ref 0–199)
CO2: 24 MMOL/L (ref 21–32)
CREAT SERPL-MCNC: 0.8 MG/DL (ref 0.6–1.1)
GFR AFRICAN AMERICAN: >60
GFR NON-AFRICAN AMERICAN: >60
GLOBULIN: 3.1 G/DL
GLUCOSE BLD-MCNC: 119 MG/DL (ref 70–99)
HCT VFR BLD CALC: 38.7 % (ref 36–48)
HDLC SERPL-MCNC: 73 MG/DL (ref 40–60)
HEMOGLOBIN: 12.6 G/DL (ref 12–16)
LDL CHOLESTEROL CALCULATED: 102 MG/DL
MCH RBC QN AUTO: 28.5 PG (ref 26–34)
MCHC RBC AUTO-ENTMCNC: 32.6 G/DL (ref 31–36)
MCV RBC AUTO: 87.3 FL (ref 80–100)
PDW BLD-RTO: 12.8 % (ref 12.4–15.4)
PLATELET # BLD: 345 K/UL (ref 135–450)
PMV BLD AUTO: 7.6 FL (ref 5–10.5)
POTASSIUM SERPL-SCNC: 4.2 MMOL/L (ref 3.5–5.1)
RBC # BLD: 4.44 M/UL (ref 4–5.2)
SODIUM BLD-SCNC: 142 MMOL/L (ref 136–145)
TOTAL PROTEIN: 7.7 G/DL (ref 6.4–8.2)
TRIGL SERPL-MCNC: 191 MG/DL (ref 0–150)
TSH REFLEX: 1.44 UIU/ML (ref 0.27–4.2)
VLDLC SERPL CALC-MCNC: 38 MG/DL
WBC # BLD: 6.5 K/UL (ref 4–11)

## 2019-03-08 PROCEDURE — 99214 OFFICE O/P EST MOD 30 MIN: CPT | Performed by: FAMILY MEDICINE

## 2019-03-08 RX ORDER — DESVENLAFAXINE 50 MG/1
TABLET, EXTENDED RELEASE ORAL
Qty: 90 TABLET | Refills: 1 | Status: SHIPPED | OUTPATIENT
Start: 2019-03-08 | End: 2019-07-08 | Stop reason: SDUPTHER

## 2019-03-08 RX ORDER — LEVOTHYROXINE SODIUM 0.15 MG/1
TABLET ORAL
Qty: 90 TABLET | Refills: 2 | Status: CANCELLED | OUTPATIENT
Start: 2019-03-08

## 2019-03-08 RX ORDER — BUPROPION HYDROCHLORIDE 300 MG/1
TABLET ORAL
Qty: 90 TABLET | Refills: 1 | Status: SHIPPED | OUTPATIENT
Start: 2019-03-08 | End: 2019-09-06 | Stop reason: SDUPTHER

## 2019-03-09 DIAGNOSIS — E78.00 PURE HYPERCHOLESTEROLEMIA: ICD-10-CM

## 2019-03-09 DIAGNOSIS — E11.9 TYPE 2 DIABETES MELLITUS WITHOUT COMPLICATION, WITHOUT LONG-TERM CURRENT USE OF INSULIN (HCC): Primary | ICD-10-CM

## 2019-03-09 LAB
ESTIMATED AVERAGE GLUCOSE: 139.9 MG/DL
HBA1C MFR BLD: 6.5 %

## 2019-03-09 RX ORDER — ATORVASTATIN CALCIUM 20 MG/1
20 TABLET, FILM COATED ORAL DAILY
Qty: 30 TABLET | Refills: 5 | Status: SHIPPED | OUTPATIENT
Start: 2019-03-09 | End: 2019-09-04 | Stop reason: SDUPTHER

## 2019-03-09 RX ORDER — METFORMIN HYDROCHLORIDE 500 MG/1
1000 TABLET, EXTENDED RELEASE ORAL 2 TIMES DAILY
Qty: 120 TABLET | Refills: 5 | Status: SHIPPED | OUTPATIENT
Start: 2019-03-09 | End: 2019-09-04 | Stop reason: SDUPTHER

## 2019-03-11 DIAGNOSIS — E03.9 ACQUIRED HYPOTHYROIDISM: ICD-10-CM

## 2019-03-11 RX ORDER — LEVOTHYROXINE SODIUM 0.15 MG/1
150 TABLET ORAL DAILY
Qty: 90 TABLET | Refills: 2 | Status: SHIPPED | OUTPATIENT
Start: 2019-03-11 | End: 2019-09-08

## 2019-03-25 DIAGNOSIS — F50.81 BINGE EATING DISORDER: ICD-10-CM

## 2019-03-25 DIAGNOSIS — F98.8 ATTENTION DEFICIT DISORDER (ADD) WITHOUT HYPERACTIVITY: Chronic | ICD-10-CM

## 2019-04-25 DIAGNOSIS — F50.81 BINGE EATING DISORDER: ICD-10-CM

## 2019-04-25 DIAGNOSIS — F98.8 ATTENTION DEFICIT DISORDER (ADD) WITHOUT HYPERACTIVITY: Chronic | ICD-10-CM

## 2019-05-24 DIAGNOSIS — F50.81 BINGE EATING DISORDER: ICD-10-CM

## 2019-05-24 DIAGNOSIS — F98.8 ATTENTION DEFICIT DISORDER (ADD) WITHOUT HYPERACTIVITY: Chronic | ICD-10-CM

## 2019-06-06 DIAGNOSIS — E11.9 TYPE 2 DIABETES MELLITUS WITHOUT COMPLICATION, WITHOUT LONG-TERM CURRENT USE OF INSULIN (HCC): ICD-10-CM

## 2019-06-06 DIAGNOSIS — E78.00 PURE HYPERCHOLESTEROLEMIA: ICD-10-CM

## 2019-06-06 PROBLEM — R73.03 PRE-DIABETES: Status: RESOLVED | Noted: 2018-06-13 | Resolved: 2019-06-06

## 2019-06-06 NOTE — PROGRESS NOTES
Subjective:      Patient ID: Raghavendra Cutler 64 y.o. HPI  [facility] The primary encounter diagnosis was Binge eating disorder. Diagnoses of Major depressive disorder, recurrent episode, moderate with anxious distress ([address] Obstructive sleep apnea syndrome, Attention deficit disorder (ADD) without hyperactivity, and Type 2 diabetes mellitus without complication, without long-term current use of insulin ([address] were also pertinent to this visit. ADD/ADHD:  Current treatment: none, which has been effective. Residual symptoms: none. Medication side effects: None. Patient denies None. Binge eating very well controlled with Vyvanse. Depression well controlled. Feels in a good place. No appetite or sleep disturbance, no anhedonia, Not suicidal.      Treatment Adherence:   Medication compliance:  compliant most of the time  Diet compliance:  compliant most of the time  Is eating much better. Stopped eating sugar. Feels better . Weight trend: decreasing down 15 lbs! Current exercise: no regular exercise. Is more active, mowing grass. Plans to start exercise routine  Barriers: lack of motivation    Diabetes Mellitus Type 2: Current symptoms/problems include none. Home blood sugar records: patient does not test  Any episodes of hypoglycemia? 2 or 3 times felt a bit shaky when went too long without eating. Eye exam current (within one year): no  Tobacco history: She  reports that she has never smoked. She has never used smokeless tobacco.   Daily Aspirin? Yes    Hypertension:  Home blood pressure monitoring: No.  She is adherent to a low sodium diet. Patient denies chest pain, shortness of breath, headache, lightheadedness, blurred vision, peripheral edema and palpitations. Antihypertensive medication side effects: no medication side effects noted. Use of agents associated with hypertension: none. Hyperlipidemia:  No new myalgias or GI upset on atorvastatin (Lipitor).        Lab Results   Component Value Date    LABA1C 6.5 03/08/2019    LABA1C 6.0 06/12/2018     Lab Results   Component Value Date    LABMICR 1.70 06/06/2019    CREATININE 0.6 06/06/2019     Lab Results   Component Value Date    ALT 19 06/06/2019    AST 19 06/06/2019     Lab Results   Component Value Date    CHOL 130 06/06/2019    TRIG 72 06/06/2019    HDL 73 (H) 06/06/2019    LDLCALC 43 06/06/2019         Outpatient Medications Marked as Taking for the 6/7/19 encounter (Office Visit) with Joshua Rodriguez MD   Medication Sig Dispense Refill    Lisdexamfetamine Dimesylate (VYVANSE) 40 MG CAPS Take 40 mg by mouth daily. 30 capsule 0    levothyroxine (SYNTHROID) 150 MCG tablet Take 1 tablet by mouth Daily 90 tablet 2    metFORMIN (GLUCOPHAGE-XR) 500 MG extended release tablet Take 2 tablets by mouth 2 times daily 120 tablet 5    atorvastatin (LIPITOR) 20 MG tablet Take 1 tablet by mouth daily 30 tablet 5    buPROPion (WELLBUTRIN XL) 300 MG extended release tablet TAKE ONE TABLET BY MOUTH EVERY MORNING 90 tablet 1    desvenlafaxine succinate (PRISTIQ) 50 MG TB24 extended release tablet TAKE ONE TABLET BY MOUTH DAILY 90 tablet 1    folic acid (FOLVITE) 1 MG tablet Take 1 mg by mouth daily      Cholecalciferol (VITAMIN D3) 2000 units CAPS Take by mouth      Omega-3 Fatty Acids (FISH OIL ADULT GUMMIES PO) Take by mouth      melatonin 3 MG TABS tablet Take 3 mg by mouth daily      BIOTIN PO Take by mouth      FIBER, CORN DEXTRIN, PO Take 1 tablet by mouth daily.  Magnesium 500 MG CAPS Take 1 tablet by mouth daily.  calcium citrate-vitamin D (CITRICAL + D) 315-250 MG-UNIT TABS Take  by mouth.  vitamin B-12 (CYANOCOBALAMIN) 500 MCG tablet Take 500 mcg by mouth daily.  therapeutic multivitamin-minerals (THERAGRAN-M) tablet Take 1 tablet by mouth daily.           Allergies   Allergen Reactions    Latex Rash    Penicillins     Cefdinir Other (See Comments)     dysphagia        Patient Active Problem List   Diagnosis    Attention deficit disorder    Sleep apnea    Hypothyroidism    Binge eating disorder    Major depressive disorder, recurrent episode, moderate with anxious distress ([facility]    Iliotibial band syndrome of left side    Type 2 diabetes mellitus without complication, without long-term current use of insulin (HCC)        Past Medical History:   Diagnosis Date    Anemia 3/25/2013    Attention deficit disorder without mention of hyperactivity     Calculus of kidney     h/o    Calculus of kidney     Cystocele, midline     h/o    Other bipolar disorders     Polyp of colon, adenomatous 1/2015    Rectocele     h/o    Unspecified hypothyroidism     Unspecified sleep apnea        History reviewed. No pertinent surgical history. Social History     Tobacco Use    Smoking status: Never Smoker    Smokeless tobacco: Never Used   Substance Use Topics    Alcohol use: Yes     Alcohol/week: 0.0 oz     Comment: infrequent    Drug use: No        Family History   Problem Relation Age of Onset    Diabetes Mother     Osteoporosis Mother     High Cholesterol Father         Review of Systems  Review of Systems    Objective:   Physical Exam  NAD   Skin is warm and dry. Well hydrated. No rash. Mood +, affect is normal.   The neck is supple and free of adenopathy or masses, the thyroid is normal without enlargement or nodules. Chest: clear with no wheezes or rales. No retractions, or use of accessory muscles noted. Cardiovascular: PMI is not displaced, and no thrill noted. Regular rate and rhythm with no rub, murmur or gallop. No peripheral edema. The abdomen is soft without tenderness, guarding, mass, rebound or organomegaly. Aorta, femoral, DP and PT pulses intact. Sensation normal to monofilament feet bilaterally. Feet in good repair, without significant callouses and without ulceration or deformity. Assessment / Plan:        Diagnosis Orders   1. Binge eating disorder  Doing well.

## 2019-06-07 ENCOUNTER — OFFICE VISIT (OUTPATIENT)
Dept: FAMILY MEDICINE CLINIC | Age: 59
End: 2019-06-07

## 2019-06-07 VITALS
WEIGHT: 222 LBS | OXYGEN SATURATION: 96 % | BODY MASS INDEX: 40.6 KG/M2 | HEART RATE: 83 BPM | DIASTOLIC BLOOD PRESSURE: 76 MMHG | RESPIRATION RATE: 16 BRPM | SYSTOLIC BLOOD PRESSURE: 125 MMHG

## 2019-06-07 DIAGNOSIS — F33.1 MAJOR DEPRESSIVE DISORDER, RECURRENT EPISODE, MODERATE WITH ANXIOUS DISTRESS (HCC): ICD-10-CM

## 2019-06-07 DIAGNOSIS — F98.8 ATTENTION DEFICIT DISORDER (ADD) WITHOUT HYPERACTIVITY: Chronic | ICD-10-CM

## 2019-06-07 DIAGNOSIS — E11.9 TYPE 2 DIABETES MELLITUS WITHOUT COMPLICATION, WITHOUT LONG-TERM CURRENT USE OF INSULIN (HCC): ICD-10-CM

## 2019-06-07 DIAGNOSIS — F50.81 BINGE EATING DISORDER: Primary | ICD-10-CM

## 2019-06-07 LAB
A/G RATIO: 1.4 (ref 1.1–2.2)
ALBUMIN SERPL-MCNC: 4.6 G/DL (ref 3.4–5)
ALP BLD-CCNC: 93 U/L (ref 40–129)
ALT SERPL-CCNC: 19 U/L (ref 10–40)
ANION GAP SERPL CALCULATED.3IONS-SCNC: 16 MMOL/L (ref 3–16)
AST SERPL-CCNC: 19 U/L (ref 15–37)
BILIRUB SERPL-MCNC: 0.3 MG/DL (ref 0–1)
BUN BLDV-MCNC: 14 MG/DL (ref 7–20)
CALCIUM SERPL-MCNC: 10 MG/DL (ref 8.3–10.6)
CHLORIDE BLD-SCNC: 101 MMOL/L (ref 99–110)
CHOLESTEROL, TOTAL: 130 MG/DL (ref 0–199)
CO2: 23 MMOL/L (ref 21–32)
CREAT SERPL-MCNC: 0.6 MG/DL (ref 0.6–1.1)
CREATININE URINE: 144.8 MG/DL (ref 28–259)
ESTIMATED AVERAGE GLUCOSE: 122.6 MG/DL
GFR AFRICAN AMERICAN: >60
GFR NON-AFRICAN AMERICAN: >60
GLOBULIN: 3.3 G/DL
GLUCOSE BLD-MCNC: 73 MG/DL (ref 70–99)
HBA1C MFR BLD: 5.9 %
HDLC SERPL-MCNC: 73 MG/DL (ref 40–60)
LDL CHOLESTEROL CALCULATED: 43 MG/DL
MICROALBUMIN UR-MCNC: 1.7 MG/DL
MICROALBUMIN/CREAT UR-RTO: 11.7 MG/G (ref 0–30)
POTASSIUM SERPL-SCNC: 4.1 MMOL/L (ref 3.5–5.1)
SODIUM BLD-SCNC: 140 MMOL/L (ref 136–145)
TOTAL PROTEIN: 7.9 G/DL (ref 6.4–8.2)
TRIGL SERPL-MCNC: 72 MG/DL (ref 0–150)
VLDLC SERPL CALC-MCNC: 14 MG/DL

## 2019-06-07 PROCEDURE — 99214 OFFICE O/P EST MOD 30 MIN: CPT | Performed by: FAMILY MEDICINE

## 2019-06-24 DIAGNOSIS — F50.81 BINGE EATING DISORDER: ICD-10-CM

## 2019-06-24 DIAGNOSIS — F98.8 ATTENTION DEFICIT DISORDER (ADD) WITHOUT HYPERACTIVITY: Chronic | ICD-10-CM

## 2019-07-08 DIAGNOSIS — F33.1 MAJOR DEPRESSIVE DISORDER, RECURRENT EPISODE, MODERATE WITH ANXIOUS DISTRESS (HCC): ICD-10-CM

## 2019-07-08 RX ORDER — DESVENLAFAXINE 50 MG/1
TABLET, EXTENDED RELEASE ORAL
Qty: 90 TABLET | Refills: 1 | Status: SHIPPED | OUTPATIENT
Start: 2019-07-08 | End: 2019-09-06 | Stop reason: SDUPTHER

## 2019-07-24 DIAGNOSIS — F98.8 ATTENTION DEFICIT DISORDER (ADD) WITHOUT HYPERACTIVITY: Chronic | ICD-10-CM

## 2019-07-24 DIAGNOSIS — F50.81 BINGE EATING DISORDER: ICD-10-CM

## 2019-08-23 DIAGNOSIS — F98.8 ATTENTION DEFICIT DISORDER (ADD) WITHOUT HYPERACTIVITY: Chronic | ICD-10-CM

## 2019-08-23 DIAGNOSIS — F50.81 BINGE EATING DISORDER: ICD-10-CM

## 2019-09-06 ENCOUNTER — OFFICE VISIT (OUTPATIENT)
Dept: FAMILY MEDICINE CLINIC | Age: 59
End: 2019-09-06

## 2019-09-06 VITALS
WEIGHT: 200.6 LBS | BODY MASS INDEX: 36.69 KG/M2 | OXYGEN SATURATION: 99 % | RESPIRATION RATE: 16 BRPM | SYSTOLIC BLOOD PRESSURE: 122 MMHG | TEMPERATURE: 96.3 F | HEART RATE: 76 BPM | DIASTOLIC BLOOD PRESSURE: 74 MMHG

## 2019-09-06 DIAGNOSIS — F50.81 BINGE EATING DISORDER: ICD-10-CM

## 2019-09-06 DIAGNOSIS — E03.9 ACQUIRED HYPOTHYROIDISM: Chronic | ICD-10-CM

## 2019-09-06 DIAGNOSIS — Z23 NEED FOR INFLUENZA VACCINATION: Primary | ICD-10-CM

## 2019-09-06 DIAGNOSIS — E11.9 TYPE 2 DIABETES MELLITUS WITHOUT COMPLICATION, WITHOUT LONG-TERM CURRENT USE OF INSULIN (HCC): ICD-10-CM

## 2019-09-06 DIAGNOSIS — G47.33 OBSTRUCTIVE SLEEP APNEA SYNDROME: Chronic | ICD-10-CM

## 2019-09-06 DIAGNOSIS — F33.1 MAJOR DEPRESSIVE DISORDER, RECURRENT EPISODE, MODERATE WITH ANXIOUS DISTRESS (HCC): ICD-10-CM

## 2019-09-06 DIAGNOSIS — E78.00 PURE HYPERCHOLESTEROLEMIA: ICD-10-CM

## 2019-09-06 DIAGNOSIS — R73.03 PRE-DIABETES: ICD-10-CM

## 2019-09-06 LAB
A/G RATIO: 1.7 (ref 1.1–2.2)
ALBUMIN SERPL-MCNC: 4.6 G/DL (ref 3.4–5)
ALP BLD-CCNC: 89 U/L (ref 40–129)
ALT SERPL-CCNC: 19 U/L (ref 10–40)
ANION GAP SERPL CALCULATED.3IONS-SCNC: 14 MMOL/L (ref 3–16)
AST SERPL-CCNC: 17 U/L (ref 15–37)
BILIRUB SERPL-MCNC: 0.3 MG/DL (ref 0–1)
BUN BLDV-MCNC: 20 MG/DL (ref 7–20)
CALCIUM SERPL-MCNC: 10 MG/DL (ref 8.3–10.6)
CHLORIDE BLD-SCNC: 102 MMOL/L (ref 99–110)
CHOLESTEROL, TOTAL: 150 MG/DL (ref 0–199)
CO2: 24 MMOL/L (ref 21–32)
CREAT SERPL-MCNC: 0.7 MG/DL (ref 0.6–1.1)
GFR AFRICAN AMERICAN: >60
GFR NON-AFRICAN AMERICAN: >60
GLOBULIN: 2.7 G/DL
GLUCOSE BLD-MCNC: 100 MG/DL (ref 70–99)
HDLC SERPL-MCNC: 91 MG/DL (ref 40–60)
LDL CHOLESTEROL CALCULATED: 46 MG/DL
POTASSIUM SERPL-SCNC: 4.3 MMOL/L (ref 3.5–5.1)
SODIUM BLD-SCNC: 140 MMOL/L (ref 136–145)
T4 FREE: 1.7 NG/DL (ref 0.9–1.8)
TOTAL PROTEIN: 7.3 G/DL (ref 6.4–8.2)
TRIGL SERPL-MCNC: 63 MG/DL (ref 0–150)
TSH REFLEX: 0.08 UIU/ML (ref 0.27–4.2)
VLDLC SERPL CALC-MCNC: 13 MG/DL

## 2019-09-06 PROCEDURE — 90686 IIV4 VACC NO PRSV 0.5 ML IM: CPT | Performed by: FAMILY MEDICINE

## 2019-09-06 PROCEDURE — 90471 IMMUNIZATION ADMIN: CPT | Performed by: FAMILY MEDICINE

## 2019-09-06 PROCEDURE — 99214 OFFICE O/P EST MOD 30 MIN: CPT | Performed by: FAMILY MEDICINE

## 2019-09-06 RX ORDER — DESVENLAFAXINE 50 MG/1
TABLET, EXTENDED RELEASE ORAL
Qty: 90 TABLET | Refills: 1 | Status: SHIPPED | OUTPATIENT
Start: 2019-09-06 | End: 2019-12-13 | Stop reason: SDUPTHER

## 2019-09-06 RX ORDER — METFORMIN HYDROCHLORIDE 500 MG/1
1000 TABLET, EXTENDED RELEASE ORAL 2 TIMES DAILY
Qty: 360 TABLET | Refills: 1 | Status: SHIPPED | OUTPATIENT
Start: 2019-09-06 | End: 2020-06-04 | Stop reason: SDUPTHER

## 2019-09-06 RX ORDER — ATORVASTATIN CALCIUM 20 MG/1
20 TABLET, FILM COATED ORAL DAILY
Qty: 90 TABLET | Refills: 1 | Status: SHIPPED | OUTPATIENT
Start: 2019-09-06 | End: 2019-12-13 | Stop reason: SDUPTHER

## 2019-09-06 RX ORDER — BUPROPION HYDROCHLORIDE 300 MG/1
TABLET ORAL
Qty: 90 TABLET | Refills: 1 | Status: SHIPPED | OUTPATIENT
Start: 2019-09-06 | End: 2019-12-13 | Stop reason: SDUPTHER

## 2019-09-06 NOTE — PATIENT INSTRUCTIONS
Patient Education      Patient Education        Resistance Training With Surgical Tubing: Exercises  Introduction  Here are some examples of exercises for resistance training. Start each exercise slowly. Ease off the exercise if you start to have pain. Your doctor or physical therapist will tell you when you can start these exercises and which ones will work best for you. How to do the exercises  Side pull    1. Raise both arms overhead, palms of your hands facing forward. 2. Pull one arm down and to the side, bending your elbow as shown, and hold. 3. Slowly reach up again. Repeat with the other arm. 4. Repeat 8 to 12 times with each hand. 5. Rest for a minute, and repeat the exercise. Overhead pull    1. Raise both arms overhead, palms of your hands facing forward. 2. Tighten the tubing by slowly pulling both arms away from center, and hold. 3. Slowly return to the starting position with your arms straight up. 4. Repeat 8 to 12 times. 5. Rest for a minute, and repeat the exercise. Up-down pull    1. Raise both arms overhead. 2. Bend your elbows so that they are shoulder height, and hold the stretched tubing behind or in front of your head. 3. Slowly return to the starting position with your arms straight up. 4. Repeat 8 to 12 times. 5. Rest for a minute, and repeat the exercise. Chest-level pull    1. Raise your arms in front of you to chest level. Your elbows will be bent and held up at about shoulder height. 2. Pull your hands apart, stretching the tubing, and hold. Try to keep your hands up at your chest level, and do not pull your shoulders up toward your ears. 3. Slowly return to your starting position. 4. Repeat 8 to 12 times. 5. Rest for a minute, and repeat the exercise. Hip-level pull    1. Hold your hands at the level of your hips, or near your lap if you are sitting down. 2. Pull your hands apart, stretching the tubing, and hold.   3. Slowly return to your starting

## 2019-09-06 NOTE — PROGRESS NOTES
Vaccine Information Sheet, \"Influenza - Inactivated\"  given to Blaise Dawson, or parent/legal guardian of  Blaise Dawson and verbalized understanding. Patient responses:    Have you ever had a reaction to a flu vaccine? No  Are you able to eat eggs without adverse effects? Yes  Do you have any current illness? No  Have you ever had Guillian [place] Syndrome? No    Flu vaccine given per order. Please see immunization tab.

## 2019-09-07 LAB
ESTIMATED AVERAGE GLUCOSE: 122.6 MG/DL
HBA1C MFR BLD: 5.9 %
T3 TOTAL: 0.99 NG/ML (ref 0.8–2)

## 2019-09-08 DIAGNOSIS — E03.9 ACQUIRED HYPOTHYROIDISM: ICD-10-CM

## 2019-09-08 RX ORDER — LEVOTHYROXINE SODIUM 137 UG/1
137 TABLET ORAL DAILY
Qty: 90 TABLET | Refills: 0 | Status: SHIPPED | OUTPATIENT
Start: 2019-09-08 | End: 2019-12-09 | Stop reason: SDUPTHER

## 2019-09-24 DIAGNOSIS — F98.8 ATTENTION DEFICIT DISORDER (ADD) WITHOUT HYPERACTIVITY: Chronic | ICD-10-CM

## 2019-09-24 DIAGNOSIS — F50.81 BINGE EATING DISORDER: ICD-10-CM

## 2019-10-24 DIAGNOSIS — F50.81 BINGE EATING DISORDER: ICD-10-CM

## 2019-10-24 DIAGNOSIS — F98.8 ATTENTION DEFICIT DISORDER (ADD) WITHOUT HYPERACTIVITY: Chronic | ICD-10-CM

## 2019-11-25 DIAGNOSIS — F98.8 ATTENTION DEFICIT DISORDER (ADD) WITHOUT HYPERACTIVITY: Chronic | ICD-10-CM

## 2019-11-25 DIAGNOSIS — F50.81 BINGE EATING DISORDER: ICD-10-CM

## 2019-12-10 ENCOUNTER — TELEPHONE (OUTPATIENT)
Dept: FAMILY MEDICINE CLINIC | Age: 59
End: 2019-12-10

## 2019-12-10 DIAGNOSIS — E11.9 TYPE 2 DIABETES MELLITUS WITHOUT COMPLICATION, WITHOUT LONG-TERM CURRENT USE OF INSULIN (HCC): Primary | ICD-10-CM

## 2019-12-10 DIAGNOSIS — E03.9 ACQUIRED HYPOTHYROIDISM: Chronic | ICD-10-CM

## 2019-12-12 PROBLEM — E11.9 TYPE 2 DIABETES MELLITUS WITHOUT COMPLICATION, WITHOUT LONG-TERM CURRENT USE OF INSULIN (HCC): Status: RESOLVED | Noted: 2019-06-06 | Resolved: 2019-12-12

## 2019-12-13 ENCOUNTER — OFFICE VISIT (OUTPATIENT)
Dept: FAMILY MEDICINE CLINIC | Age: 59
End: 2019-12-13

## 2019-12-13 VITALS
OXYGEN SATURATION: 98 % | BODY MASS INDEX: 34.2 KG/M2 | SYSTOLIC BLOOD PRESSURE: 122 MMHG | RESPIRATION RATE: 16 BRPM | TEMPERATURE: 96.1 F | DIASTOLIC BLOOD PRESSURE: 82 MMHG | HEART RATE: 75 BPM | WEIGHT: 187 LBS

## 2019-12-13 DIAGNOSIS — F33.1 MAJOR DEPRESSIVE DISORDER, RECURRENT EPISODE, MODERATE WITH ANXIOUS DISTRESS (HCC): Primary | ICD-10-CM

## 2019-12-13 DIAGNOSIS — Z12.39 BREAST CANCER SCREENING: ICD-10-CM

## 2019-12-13 DIAGNOSIS — F50.81 BINGE EATING DISORDER: ICD-10-CM

## 2019-12-13 DIAGNOSIS — F98.8 ATTENTION DEFICIT DISORDER (ADD) WITHOUT HYPERACTIVITY: Chronic | ICD-10-CM

## 2019-12-13 DIAGNOSIS — E78.00 PURE HYPERCHOLESTEROLEMIA: ICD-10-CM

## 2019-12-13 DIAGNOSIS — R73.03 PRE-DIABETES: ICD-10-CM

## 2019-12-13 PROCEDURE — 99214 OFFICE O/P EST MOD 30 MIN: CPT | Performed by: FAMILY MEDICINE

## 2019-12-13 RX ORDER — LANCETS 30 GAUGE
EACH MISCELLANEOUS
Qty: 100 EACH | Refills: 3 | Status: SHIPPED | OUTPATIENT
Start: 2019-12-13 | End: 2020-12-08

## 2019-12-13 RX ORDER — BLOOD-GLUCOSE METER
1 KIT MISCELLANEOUS DAILY
Qty: 1 KIT | Refills: 0 | Status: SHIPPED | OUTPATIENT
Start: 2019-12-13

## 2019-12-13 RX ORDER — ATORVASTATIN CALCIUM 20 MG/1
20 TABLET, FILM COATED ORAL DAILY
Qty: 90 TABLET | Refills: 1 | Status: SHIPPED | OUTPATIENT
Start: 2019-12-13 | End: 2020-06-04 | Stop reason: SDUPTHER

## 2019-12-13 RX ORDER — BUPROPION HYDROCHLORIDE 300 MG/1
TABLET ORAL
Qty: 90 TABLET | Refills: 1 | Status: SHIPPED | OUTPATIENT
Start: 2019-12-13 | End: 2020-06-04 | Stop reason: SDUPTHER

## 2019-12-13 RX ORDER — DESVENLAFAXINE 50 MG/1
TABLET, EXTENDED RELEASE ORAL
Qty: 90 TABLET | Refills: 1 | Status: SHIPPED | OUTPATIENT
Start: 2019-12-13 | End: 2020-06-04 | Stop reason: SDUPTHER

## 2020-03-10 RX ORDER — LEVOTHYROXINE SODIUM 0.12 MG/1
TABLET ORAL
Qty: 90 TABLET | Refills: 1 | Status: SHIPPED | OUTPATIENT
Start: 2020-03-10 | End: 2020-09-21

## 2020-03-31 ENCOUNTER — E-VISIT (OUTPATIENT)
Dept: FAMILY MEDICINE CLINIC | Age: 60
End: 2020-03-31

## 2020-03-31 PROCEDURE — 99421 OL DIG E/M SVC 5-10 MIN: CPT | Performed by: FAMILY MEDICINE

## 2020-04-01 RX ORDER — LISDEXAMFETAMINE DIMESYLATE 40 MG/1
40 CAPSULE ORAL DAILY
Qty: 30 CAPSULE | Refills: 0 | Status: SHIPPED | OUTPATIENT
Start: 2020-05-01 | End: 2020-06-04 | Stop reason: DRUGHIGH

## 2020-04-01 RX ORDER — LISDEXAMFETAMINE DIMESYLATE 40 MG/1
40 CAPSULE ORAL DAILY
Qty: 30 CAPSULE | Refills: 0 | Status: SHIPPED | OUTPATIENT
Start: 2020-05-31 | End: 2020-06-04 | Stop reason: DRUGHIGH

## 2020-04-01 RX ORDER — LISDEXAMFETAMINE DIMESYLATE 40 MG/1
40 CAPSULE ORAL DAILY
Qty: 30 CAPSULE | Refills: 0 | Status: SHIPPED | OUTPATIENT
Start: 2020-04-01 | End: 2020-06-04 | Stop reason: DRUGHIGH

## 2020-04-01 NOTE — PROGRESS NOTES
Patient Active Problem List   Diagnosis    Attention deficit disorder    Sleep apnea    Hypothyroidism    Binge eating disorder    Major depressive disorder, recurrent episode, moderate with anxious distress (HCC)    Pre-diabetes    Iliotibial band syndrome of left side     Allergies   Allergen Reactions    Latex Rash    Penicillins     Cefdinir Other (See Comments)     dysphagia      Social History     Tobacco Use    Smoking status: Never Smoker    Smokeless tobacco: Never Used   Substance Use Topics    Alcohol use: Yes     Alcohol/week: 0.0 standard drinks     Comment: infrequent    Drug use: No        OARRS received. Diagnosis Orders   1. Attention deficit disorder (ADD) without hyperactivity  Lisdexamfetamine Dimesylate (VYVANSE) 40 MG CAPS    Lisdexamfetamine Dimesylate (VYVANSE) 40 MG CAPS    Lisdexamfetamine Dimesylate (VYVANSE) 40 MG CAPS   2. Binge eating disorder  Lisdexamfetamine Dimesylate (VYVANSE) 40 MG CAPS    Lisdexamfetamine Dimesylate (VYVANSE) 40 MG CAPS    Lisdexamfetamine Dimesylate (VYVANSE) 40 MG CAPS   3. Acquired hypothyroidism        5-10 minutes were spent on the digital evaluation and management of this patient. Alma Means

## 2020-06-02 PROBLEM — D12.6 TUBULAR ADENOMA OF COLON: Status: ACTIVE | Noted: 2020-06-02

## 2020-06-02 NOTE — PROGRESS NOTES
2020    TELEHEALTH EVALUATION -- Audio/Visual (During RQKMU-27 public health emergency)    HPI:    Howard Ramosamy [facility] (:  1960) has requested an audio/video evaluation for the following concern(s):    The primary encounter diagnosis was Attention deficit disorder (ADD) without hyperactivity. Diagnoses of Major depressive disorder, recurrent episode, moderate with anxious distress ([address] Binge eating disorder, Pre-diabetes, and Tubular adenoma of colon were also pertinent to this visit. She is aware she is due for colonoscopy   She is not sure if she is going to have it in [place] or [place]. She had eye exam in early spring; reported as normal.  She will have report sent. She is doing well. Some anxiety with COVID restrictions but is doing well. No appetite or sleep disturbance, no anhedonia, Not suicidal.  Functioning well. ADD/ADHD:  Current treatment: Vyvanse- 40 mg, which has been somewhat effective. Residual symptoms: inattention - mild. Medication side effects: None. Patient denies anorexia, involuntary weight loss, palpitations, insomnia, irritability and anxiety. Binge eating is not as well controlled - off diet and not exercising the past 2 months. Is starting to get back to exercise and improved diet. She is monitoring FSBS a few times a week. Ranges . No polyuria, polydipsia, blurred vision. Thyroid: Patient presents for evaluation of hypothyroidism. Current symptoms denies fatigue, weight changes, heat/cold intolerance, bowel/skin changes or CVS symptoms. Hyperlipidemia:  No new myalgias or GI upset on atorvastatin (Lipitor). Medication compliance: compliant most of the time. Patient is  following a low fat, low cholesterol diet. She is not exercising regularly.      Lab Results   Component Value Date    CHOL 150 2019    TRIG 63 2019    HDL 91 (H) 2019    LDLCALC 46 2019     Lab Results   Component Value Date    ALT facial skin         [] Abnormal-            Psychiatric:       [x] Normal Affect [] No Hallucinations        [] Abnormal-     Other pertinent observable physical exam findings-     ASSESSMENT/PLAN:  1. Attention deficit disorder (ADD) without hyperactivity  Increase dose of Vyvanse for residual sxs  Controlled Substance Monitoring:    Acute and Chronic Pain Monitoring:   RX Monitoring 11/15/2017   Attestation -   Periodic Controlled Substance Monitoring Possible medication side effects, risk of tolerance and/or dependence, and alternative treatments discussed. ;No signs of potential drug abuse or diversion identified. - lisdexamfetamine (VYVANSE) 50 MG capsule; Take 1 capsule by mouth every morning for 30 days. Dispense: 30 capsule; Refill: 0  - lisdexamfetamine (VYVANSE) 50 MG capsule; Take 1 capsule by mouth every morning for 30 days. Dispense: 30 capsule; Refill: 0  - lisdexamfetamine (VYVANSE) 50 MG capsule; Take 1 capsule by mouth every morning for 30 days. Dispense: 30 capsule; Refill: 0    2. Major depressive disorder, recurrent episode, moderate with anxious distress ([address]  Well controlled; continue pristiq and wellbutrin  - desvenlafaxine succinate (PRISTIQ) 50 MG TB24 extended release tablet; TAKE 1 TABLET BY MOUTH EVERY DAY  Dispense: 90 tablet; Refill: 1    3. Binge eating disorder  Not doing as well as previously. Behavioral changes and long term strategies addressed. Try higher dose Vyvanse  - lisdexamfetamine (VYVANSE) 50 MG capsule; Take 1 capsule by mouth every morning for 30 days. Dispense: 30 capsule; Refill: 0  - lisdexamfetamine (VYVANSE) 50 MG capsule; Take 1 capsule by mouth every morning for 30 days. Dispense: 30 capsule; Refill: 0  - lisdexamfetamine (VYVANSE) 50 MG capsule; Take 1 capsule by mouth every morning for 30 days. Dispense: 30 capsule; Refill: 0    4. Pre-diabetes  Discussed diet, exercise and weight loss strategies   Continue Metformin    5.  Tubular adenoma of

## 2020-06-04 ENCOUNTER — TELEMEDICINE (OUTPATIENT)
Dept: FAMILY MEDICINE CLINIC | Age: 60
End: 2020-06-04

## 2020-06-04 VITALS — WEIGHT: 200 LBS | TEMPERATURE: 98.2 F | HEIGHT: 62 IN | BODY MASS INDEX: 36.8 KG/M2

## 2020-06-04 PROCEDURE — 99214 OFFICE O/P EST MOD 30 MIN: CPT | Performed by: FAMILY MEDICINE

## 2020-06-04 RX ORDER — LISDEXAMFETAMINE DIMESYLATE 40 MG/1
40 CAPSULE ORAL DAILY
Qty: 30 CAPSULE | Refills: 0 | Status: CANCELLED | OUTPATIENT
Start: 2020-08-29 | End: 2020-09-28

## 2020-06-04 RX ORDER — CHLORAL HYDRATE 500 MG
3000 CAPSULE ORAL DAILY
COMMUNITY
End: 2020-06-04

## 2020-06-04 RX ORDER — ATORVASTATIN CALCIUM 20 MG/1
20 TABLET, FILM COATED ORAL DAILY
Qty: 90 TABLET | Refills: 1 | Status: SHIPPED | OUTPATIENT
Start: 2020-06-04 | End: 2020-12-04 | Stop reason: SDUPTHER

## 2020-06-04 RX ORDER — LISDEXAMFETAMINE DIMESYLATE 40 MG/1
40 CAPSULE ORAL DAILY
Qty: 30 CAPSULE | Refills: 0 | Status: CANCELLED | OUTPATIENT
Start: 2020-06-04 | End: 2021-06-04

## 2020-06-04 RX ORDER — BUPROPION HYDROCHLORIDE 300 MG/1
TABLET ORAL
Qty: 90 TABLET | Refills: 1 | Status: SHIPPED | OUTPATIENT
Start: 2020-06-04 | End: 2020-12-04 | Stop reason: SDUPTHER

## 2020-06-04 RX ORDER — METFORMIN HYDROCHLORIDE 500 MG/1
1000 TABLET, EXTENDED RELEASE ORAL 2 TIMES DAILY
Qty: 360 TABLET | Refills: 1 | Status: SHIPPED | OUTPATIENT
Start: 2020-06-04 | End: 2020-12-04 | Stop reason: SDUPTHER

## 2020-06-04 RX ORDER — DESVENLAFAXINE 50 MG/1
TABLET, EXTENDED RELEASE ORAL
Qty: 90 TABLET | Refills: 1 | Status: SHIPPED | OUTPATIENT
Start: 2020-06-04 | End: 2020-12-04 | Stop reason: SDUPTHER

## 2020-06-04 RX ORDER — LISDEXAMFETAMINE DIMESYLATE 40 MG/1
40 CAPSULE ORAL DAILY
Qty: 30 CAPSULE | Refills: 0 | Status: CANCELLED | OUTPATIENT
Start: 2020-07-30 | End: 2020-08-29

## 2020-06-04 ASSESSMENT — ENCOUNTER SYMPTOMS
CONSTIPATION: 0
SHORTNESS OF BREATH: 0
COUGH: 0

## 2020-09-08 ENCOUNTER — E-VISIT (OUTPATIENT)
Dept: FAMILY MEDICINE CLINIC | Age: 60
End: 2020-09-08

## 2020-09-08 PROCEDURE — 99421 OL DIG E/M SVC 5-10 MIN: CPT | Performed by: FAMILY MEDICINE

## 2020-09-08 NOTE — PROGRESS NOTES
Patient Active Problem List   Diagnosis    Attention deficit disorder    Sleep apnea    Hypothyroidism    Binge eating disorder    Major depressive disorder, recurrent episode, moderate with anxious distress (HCC)    Pre-diabetes    Iliotibial band syndrome of left side    Tubular adenoma of colon      Past Medical History:   Diagnosis Date    Anemia 3/25/2013    Attention deficit disorder without mention of hyperactivity     Calculus of kidney     h/o    Calculus of kidney     Cystocele, midline     h/o    Other bipolar disorders     Polyp of colon, adenomatous 1/2015    Rectocele     h/o    Type 2 diabetes mellitus without complication, without long-term current use of insulin ([facility] 6/6/2019    Unspecified hypothyroidism     Unspecified sleep apnea      Social History     Tobacco Use    Smoking status: Never Smoker    Smokeless tobacco: Never Used   Substance Use Topics    Alcohol use: Yes     Alcohol/week: 0.0 standard drinks     Comment: infrequent    Drug use: No      Allergies   Allergen Reactions    Latex Rash    Penicillins     Cefdinir Other (See Comments)     dysphagia      Current Outpatient Medications on File Prior to Visit   Medication Sig Dispense Refill    buPROPion (WELLBUTRIN XL) 300 MG extended release tablet TAKE ONE TABLET BY MOUTH EVERY MORNING 90 tablet 1    desvenlafaxine succinate (PRISTIQ) 50 MG TB24 extended release tablet TAKE 1 TABLET BY MOUTH EVERY DAY 90 tablet 1    atorvastatin (LIPITOR) 20 MG tablet Take 1 tablet by mouth daily 90 tablet 1    metFORMIN (GLUCOPHAGE-XR) 500 MG extended release tablet Take 2 tablets by mouth 2 times daily 360 tablet 1    lisdexamfetamine (VYVANSE) 50 MG capsule Take 1 capsule by mouth every morning for 30 days. 30 capsule 0    lisdexamfetamine (VYVANSE) 50 MG capsule Take 1 capsule by mouth every morning for 30 days.  30 capsule 0    lisdexamfetamine (VYVANSE) 50 MG capsule Take 1 capsule by mouth every morning for 30

## 2020-10-26 ENCOUNTER — TELEPHONE (OUTPATIENT)
Dept: FAMILY MEDICINE CLINIC | Age: 60
End: 2020-10-26

## 2020-10-26 LAB — DIABETIC RETINOPATHY: NEGATIVE

## 2020-11-11 DIAGNOSIS — Z12.39 BREAST CANCER SCREENING: ICD-10-CM

## 2020-12-02 NOTE — PROGRESS NOTES
Subjective:      Patient ID: Meaghan Quarles [address] 64 y.o. female. The primary encounter diagnosis was Attention deficit disorder (ADD) without hyperactivity. Diagnoses of Major depressive disorder, recurrent episode, moderate with anxious distress ([address] Binge eating disorder, Pre-diabetes, Tubular adenoma of colon, and Acquired hypothyroidism were also pertinent to this visit. HPI    ADD/ADHD:  Current treatment: Vyvanse- 50 mg, which has been effective. Is also effective for binge eating. Was stress eating with COVID: this improved with increased dose of Vyvanse. Residual symptoms: none. Medication side effects: None. Patient denies anorexia, palpitations, insomnia, irritability and anxiety. Thyroid: Patient presents for evaluation of hypothyroidism. Current symptoms denies fatigue, weight changes, heat/cold intolerance, bowel/skin changes or CVS symptoms. Has mild hot flashes. Is 15 months since last menses. Hyperlipidemia:  No new myalgias or GI upset on atorvastatin (Lipitor). Medication compliance: compliant most of the time. Patient is  following a low fat, low cholesterol diet. She is  exercising regularly. Exercising daily. Is not checking FSBS>  Gained 25 lb with COVID; has lost 10 lbs of that. No polyuria, polydipsia, blurred vision. She has a colonoscopy scheduled in [place] in a few weeks.      Lab Results   Component Value Date     09/06/2019    K 4.3 09/06/2019     09/06/2019    CO2 24 09/06/2019    BUN 20 09/06/2019    CREATININE 0.7 09/06/2019    GLUCOSE 100 (H) 09/06/2019    CALCIUM 10.0 09/06/2019    PROT 7.3 09/06/2019    LABALBU 4.6 09/06/2019    BILITOT 0.3 09/06/2019    ALKPHOS 89 09/06/2019    AST 17 09/06/2019    ALT 19 09/06/2019    LABGLOM >60 09/06/2019    GFRAA >60 09/06/2019    AGRATIO 1.7 09/06/2019    GLOB 2.7 09/06/2019        Lab Results   Component Value Date    LABA1C 5.9 09/06/2019     Lab Results   Component Value Date    EAG Take 1 tablet by mouth daily 90 tablet 1    metFORMIN (GLUCOPHAGE-XR) 500 MG extended release tablet Take 2 tablets by mouth 2 times daily 360 tablet 1    Lancets MISC Test qd 100 each 3    glucose monitoring kit (FREESTYLE) monitoring kit 1 kit by Does not apply route daily 1 kit 0    blood glucose test strips (ASCENSIA AUTODISC VI;ONE TOUCH ULTRA TEST VI) strip 1 each by Does not apply route daily 428 strip 3    folic acid (FOLVITE) 1 MG tablet Take 1 mg by mouth daily      Cholecalciferol (VITAMIN D3) 2000 units CAPS Take by mouth      Omega-3 Fatty Acids (FISH OIL ADULT GUMMIES PO) Take by mouth      melatonin 3 MG TABS tablet Take 3 mg by mouth daily      FIBER, CORN DEXTRIN, PO Take 1 tablet by mouth daily.  Magnesium 500 MG CAPS Take 1 tablet by mouth daily.  calcium citrate-vitamin D (CITRICAL + D) 315-250 MG-UNIT TABS Take  by mouth.  vitamin B-12 (CYANOCOBALAMIN) 500 MCG tablet Take 500 mcg by mouth daily.  therapeutic multivitamin-minerals (THERAGRAN-M) tablet Take 1 tablet by mouth daily.           Allergies   Allergen Reactions    Latex Rash    Penicillins     Cefdinir Other (See Comments)     dysphagia       Patient Active Problem List   Diagnosis    Attention deficit disorder    Sleep apnea    Hypothyroidism    Binge eating disorder    Major depressive disorder, recurrent episode, moderate with anxious distress (HCC)    Pre-diabetes    Iliotibial band syndrome of left side    Tubular adenoma of colon       Past Medical History:   Diagnosis Date    Anemia 3/25/2013    Attention deficit disorder without mention of hyperactivity     Calculus of kidney     h/o    Calculus of kidney     Cystocele, midline     h/o    Other bipolar disorders     Polyp of colon, adenomatous 1/2015    Rectocele     h/o    Type 2 diabetes mellitus without complication, without long-term current use of insulin ([facility] 6/6/2019    Unspecified hypothyroidism     Unspecified sleep

## 2020-12-03 ENCOUNTER — TELEPHONE (OUTPATIENT)
Dept: FAMILY MEDICINE CLINIC | Age: 60
End: 2020-12-03

## 2020-12-03 DIAGNOSIS — Z00.00 WELL ADULT EXAM: ICD-10-CM

## 2020-12-03 DIAGNOSIS — R73.03 PRE-DIABETES: ICD-10-CM

## 2020-12-03 DIAGNOSIS — E03.9 ACQUIRED HYPOTHYROIDISM: Primary | Chronic | ICD-10-CM

## 2020-12-03 NOTE — TELEPHONE ENCOUNTER
Patient would like to have her blood work done tomorrow morning. Please un-pend orders. Please advise. Thanks.

## 2020-12-04 ENCOUNTER — OFFICE VISIT (OUTPATIENT)
Dept: FAMILY MEDICINE CLINIC | Age: 60
End: 2020-12-04

## 2020-12-04 VITALS
BODY MASS INDEX: 36.07 KG/M2 | TEMPERATURE: 98 F | DIASTOLIC BLOOD PRESSURE: 69 MMHG | HEIGHT: 62 IN | HEART RATE: 80 BPM | RESPIRATION RATE: 12 BRPM | WEIGHT: 196 LBS | OXYGEN SATURATION: 99 % | SYSTOLIC BLOOD PRESSURE: 125 MMHG

## 2020-12-04 DIAGNOSIS — R73.03 PRE-DIABETES: ICD-10-CM

## 2020-12-04 DIAGNOSIS — Z00.00 WELL ADULT EXAM: ICD-10-CM

## 2020-12-04 DIAGNOSIS — E03.9 ACQUIRED HYPOTHYROIDISM: Chronic | ICD-10-CM

## 2020-12-04 PROCEDURE — 99214 OFFICE O/P EST MOD 30 MIN: CPT | Performed by: FAMILY MEDICINE

## 2020-12-04 RX ORDER — ATORVASTATIN CALCIUM 20 MG/1
20 TABLET, FILM COATED ORAL DAILY
Qty: 90 TABLET | Refills: 1 | Status: SHIPPED | OUTPATIENT
Start: 2020-12-04 | End: 2021-06-04 | Stop reason: SDUPTHER

## 2020-12-04 RX ORDER — METFORMIN HYDROCHLORIDE 500 MG/1
1000 TABLET, EXTENDED RELEASE ORAL 2 TIMES DAILY
Qty: 360 TABLET | Refills: 1 | Status: SHIPPED | OUTPATIENT
Start: 2020-12-04 | End: 2021-06-04 | Stop reason: SDUPTHER

## 2020-12-04 RX ORDER — LEVOTHYROXINE SODIUM 0.12 MG/1
TABLET ORAL
Qty: 90 TABLET | Refills: 0 | Status: SHIPPED | OUTPATIENT
Start: 2020-12-04 | End: 2021-03-01

## 2020-12-04 RX ORDER — BUPROPION HYDROCHLORIDE 300 MG/1
TABLET ORAL
Qty: 90 TABLET | Refills: 1 | Status: SHIPPED | OUTPATIENT
Start: 2020-12-04 | End: 2021-06-01

## 2020-12-04 RX ORDER — DESVENLAFAXINE 50 MG/1
TABLET, EXTENDED RELEASE ORAL
Qty: 90 TABLET | Refills: 1 | Status: SHIPPED | OUTPATIENT
Start: 2020-12-04 | End: 2021-06-04 | Stop reason: SDUPTHER

## 2020-12-05 LAB
A/G RATIO: 2 (ref 1.1–2.2)
ALBUMIN SERPL-MCNC: 4.8 G/DL (ref 3.4–5)
ALP BLD-CCNC: 118 U/L (ref 40–129)
ALT SERPL-CCNC: 20 U/L (ref 10–40)
ANION GAP SERPL CALCULATED.3IONS-SCNC: 11 MMOL/L (ref 3–16)
AST SERPL-CCNC: 19 U/L (ref 15–37)
BILIRUB SERPL-MCNC: 0.3 MG/DL (ref 0–1)
BUN BLDV-MCNC: 20 MG/DL (ref 7–20)
CALCIUM SERPL-MCNC: 10.3 MG/DL (ref 8.3–10.6)
CHLORIDE BLD-SCNC: 102 MMOL/L (ref 99–110)
CHOLESTEROL, TOTAL: 158 MG/DL (ref 0–199)
CO2: 28 MMOL/L (ref 21–32)
CREAT SERPL-MCNC: 0.7 MG/DL (ref 0.6–1.1)
ESTIMATED AVERAGE GLUCOSE: 116.9 MG/DL
GFR AFRICAN AMERICAN: >60
GFR NON-AFRICAN AMERICAN: >60
GLOBULIN: 2.4 G/DL
GLUCOSE BLD-MCNC: 107 MG/DL (ref 70–99)
HBA1C MFR BLD: 5.7 %
HCT VFR BLD CALC: 36.4 % (ref 36–48)
HDLC SERPL-MCNC: 98 MG/DL (ref 40–60)
HEMOGLOBIN: 12.1 G/DL (ref 12–16)
LDL CHOLESTEROL CALCULATED: 51 MG/DL
MCH RBC QN AUTO: 29.2 PG (ref 26–34)
MCHC RBC AUTO-ENTMCNC: 33.3 G/DL (ref 31–36)
MCV RBC AUTO: 87.6 FL (ref 80–100)
PDW BLD-RTO: 13.3 % (ref 12.4–15.4)
PLATELET # BLD: 278 K/UL (ref 135–450)
PMV BLD AUTO: 8.2 FL (ref 5–10.5)
POTASSIUM SERPL-SCNC: 4.6 MMOL/L (ref 3.5–5.1)
RBC # BLD: 4.16 M/UL (ref 4–5.2)
SODIUM BLD-SCNC: 141 MMOL/L (ref 136–145)
TOTAL PROTEIN: 7.2 G/DL (ref 6.4–8.2)
TRIGL SERPL-MCNC: 47 MG/DL (ref 0–150)
TSH REFLEX: 0.45 UIU/ML (ref 0.27–4.2)
VLDLC SERPL CALC-MCNC: 9 MG/DL
WBC # BLD: 5.3 K/UL (ref 4–11)

## 2020-12-08 RX ORDER — LANCETS 30 GAUGE
1 EACH MISCELLANEOUS DAILY
Qty: 100 EACH | Refills: 5 | Status: SHIPPED | OUTPATIENT
Start: 2020-12-08

## 2021-03-23 ENCOUNTER — E-VISIT (OUTPATIENT)
Dept: FAMILY MEDICINE CLINIC | Age: 61
End: 2021-03-23

## 2021-03-23 DIAGNOSIS — F98.8 ATTENTION DEFICIT DISORDER (ADD) WITHOUT HYPERACTIVITY: Chronic | ICD-10-CM

## 2021-03-23 DIAGNOSIS — F50.81 BINGE EATING DISORDER: ICD-10-CM

## 2021-03-23 PROCEDURE — 99421 OL DIG E/M SVC 5-10 MIN: CPT | Performed by: FAMILY MEDICINE

## 2021-03-23 NOTE — PROGRESS NOTES
Patient Active Problem List   Diagnosis    Attention deficit disorder    Sleep apnea    Hypothyroidism    Binge eating disorder    Major depressive disorder, recurrent episode, moderate with anxious distress (HCC)    Pre-diabetes    Iliotibial band syndrome of left side    Tubular adenoma of colon      Past Medical History:   Diagnosis Date    Anemia 3/25/2013    Attention deficit disorder without mention of hyperactivity     Calculus of kidney     h/o    Calculus of kidney     Cystocele, midline     h/o    Other bipolar disorders     Polyp of colon, adenomatous 1/2015    Rectocele     h/o    Type 2 diabetes mellitus without complication, without long-term [facility] 6[facility]    Unspecified hypothyroidism     Unspecified sleep apnea      Social History     Tobacco Use    Smoking status: Never Smoker    Smokeless tobacco: Never Used   Substance Use Topics    Alcohol use: Yes     Alcohol/week: 0.0 standard drinks     Comment: infrequent    Drug use: No      Allergies   Allergen Reactions    Latex Rash    Penicillins     Cefdinir Other (See Comments)     dysphagia      Current Outpatient Medications on File Prior to Visit   Medication Sig Dispense Refill    levothyroxine (SYNTHROID) 125 MCG tablet TAKE 1 TABLET BY MOUTH EVERY DAY 90 tablet 2    Lancets MISC 1 each by Does not apply route daily 100 each 5    lisdexamfetamine (VYVANSE) 50 MG capsule Take 1 capsule by mouth every morning for 30 days. 30 capsule 0    lisdexamfetamine (VYVANSE) 50 MG capsule Take 1 capsule by mouth every morning for 30 days. 30 capsule 0    lisdexamfetamine (VYVANSE) 50 MG capsule Take 1 capsule by mouth every morning for 30 days.  30 capsule 0    buPROPion (WELLBUTRIN XL) 300 MG extended release tablet TAKE ONE TABLET BY MOUTH EVERY MORNING 90 tablet 1    desvenlafaxine succinate (PRISTIQ) 50 MG TB24 extended release tablet TAKE 1 TABLET BY MOUTH EVERY DAY 90 tablet 1    atorvastatin (LIPITOR) 20 MG tablet Take 1 tablet by mouth daily 90 tablet 1    metFORMIN (GLUCOPHAGE-XR) 500 MG extended release tablet Take 2 tablets by mouth 2 times daily 360 tablet 1    glucose monitoring kit (FREESTYLE) monitoring kit 1 kit by Does not apply route daily 1 kit 0    blood glucose test strips (ASCENSIA AUTODISC VI;ONE TOUCH ULTRA TEST VI) strip 1 each by Does not apply route daily 896 strip 3    folic acid (FOLVITE) 1 MG tablet Take 1 mg by mouth daily      Cholecalciferol (VITAMIN D3) 2000 units CAPS Take by mouth      Omega-3 Fatty Acids (FISH OIL ADULT GUMMIES PO) Take by mouth      melatonin 3 MG TABS tablet Take 3 mg by mouth daily      FIBER, CORN DEXTRIN, PO Take 1 tablet by mouth daily.  Magnesium 500 MG CAPS Take 1 tablet by mouth daily.  calcium citrate-vitamin D (CITRICAL + D) 315-250 MG-UNIT TABS Take  by mouth.  vitamin B-12 (CYANOCOBALAMIN) 500 MCG tablet Take 500 mcg by mouth daily.  therapeutic multivitamin-minerals (THERAGRAN-M) tablet Take 1 tablet by mouth daily. No current facility-administered medications on file prior to visit. Lab Results   Component Value Date     12/04/2020    K 4.6 12/04/2020     12/04/2020    CO2 28 12/04/2020    BUN 20 12/04/2020    CREATININE 0.7 12/04/2020    GLUCOSE 107 12/04/2020    CALCIUM 10.3 12/04/2020       TSH   Date Value Ref Range Status   12/04/2020 0.45 0.27 - 4.20 uIU/mL Final   09/06/2019 0.08 (L) 0.27 - 4.20 uIU/mL Final   03/08/2019 1.44 0.27 - 4.20 uIU/mL Final   05/06/2016 2.51 0.27 - 4.20 uIU/mL Final   03/20/2013 3.45 0.35 - 5.5 uIU/ml Final   01/24/2012 1.93 0.35 - 5.5 uIU/ml Final   01/19/2011 3.36 0.35 - 5.5 uIU/ml Final      PDMP reviewed and no concerns noted. Diagnosis Orders   1. Attention deficit disorder (ADD) without hyperactivity  lisdexamfetamine (VYVANSE) 50 MG capsule    lisdexamfetamine (VYVANSE) 50 MG capsule    lisdexamfetamine (VYVANSE) 50 MG capsule   2.  Binge eating disorder  lisdexamfetamine (VYVANSE) 50 MG capsule    lisdexamfetamine (VYVANSE) 50 MG capsule    lisdexamfetamine (VYVANSE) 50 MG capsule      5-10 minutes were spent on the digital evaluation and management of this patient.

## 2021-03-25 DIAGNOSIS — F50.81 BINGE EATING DISORDER: ICD-10-CM

## 2021-03-25 DIAGNOSIS — F98.8 ATTENTION DEFICIT DISORDER (ADD) WITHOUT HYPERACTIVITY: Chronic | ICD-10-CM

## 2021-03-25 NOTE — TELEPHONE ENCOUNTER
PT called in to let know that she ran out of medication a few days ago and when she had the medication check she forgot to inform the dr. The medication is set to begin on 4/3/2021 but pt is wondering if this can be started right away?    lisdexamfetamine (VYVANSE) 50 MG capsule [[id number]]     Order Details  Dose: 50 mg Route: Oral Frequency: EVERY MORNING   Dispense Quantity: 30 capsule Refills: 0          Sig: Take 1 capsule by mouth every morning for 30 days.         Start Date: 04/03/21 End Date: 05/03/21 after 30 doses   Written Date: 03/23/21 Expiration Date: 05/22/21       Please advise, thanks.

## 2021-05-30 DIAGNOSIS — F33.1 MAJOR DEPRESSIVE DISORDER, RECURRENT EPISODE, MODERATE WITH ANXIOUS DISTRESS (HCC): ICD-10-CM

## 2021-06-01 RX ORDER — BUPROPION HYDROCHLORIDE 300 MG/1
TABLET ORAL
Qty: 90 TABLET | Refills: 1 | Status: SHIPPED | OUTPATIENT
Start: 2021-06-01 | End: 2021-12-10 | Stop reason: SDUPTHER

## 2021-06-04 ENCOUNTER — OFFICE VISIT (OUTPATIENT)
Dept: FAMILY MEDICINE CLINIC | Age: 61
End: 2021-06-04

## 2021-06-04 VITALS
BODY MASS INDEX: 37.17 KG/M2 | HEIGHT: 62 IN | OXYGEN SATURATION: 96 % | TEMPERATURE: 97.7 F | WEIGHT: 202 LBS | RESPIRATION RATE: 14 BRPM | DIASTOLIC BLOOD PRESSURE: 68 MMHG | HEART RATE: 80 BPM | SYSTOLIC BLOOD PRESSURE: 124 MMHG

## 2021-06-04 DIAGNOSIS — R73.03 PRE-DIABETES: ICD-10-CM

## 2021-06-04 DIAGNOSIS — E78.00 PURE HYPERCHOLESTEROLEMIA: ICD-10-CM

## 2021-06-04 DIAGNOSIS — F33.1 MAJOR DEPRESSIVE DISORDER, RECURRENT EPISODE, MODERATE WITH ANXIOUS DISTRESS (HCC): ICD-10-CM

## 2021-06-04 DIAGNOSIS — F50.81 BINGE EATING DISORDER: ICD-10-CM

## 2021-06-04 DIAGNOSIS — F98.8 ATTENTION DEFICIT DISORDER (ADD) WITHOUT HYPERACTIVITY: Primary | Chronic | ICD-10-CM

## 2021-06-04 DIAGNOSIS — M19.071 OSTEOARTHRITIS OF JOINT OF TOE OF RIGHT FOOT: ICD-10-CM

## 2021-06-04 LAB
ANION GAP SERPL CALCULATED.3IONS-SCNC: 11 MMOL/L (ref 3–16)
BUN BLDV-MCNC: 14 MG/DL (ref 7–20)
CALCIUM SERPL-MCNC: 9.7 MG/DL (ref 8.3–10.6)
CHLORIDE BLD-SCNC: 102 MMOL/L (ref 99–110)
CO2: 27 MMOL/L (ref 21–32)
CREAT SERPL-MCNC: 0.7 MG/DL (ref 0.6–1.1)
GFR AFRICAN AMERICAN: >60
GFR NON-AFRICAN AMERICAN: >60
GLUCOSE BLD-MCNC: 99 MG/DL (ref 70–99)
POTASSIUM SERPL-SCNC: 4.2 MMOL/L (ref 3.5–5.1)
SODIUM BLD-SCNC: 140 MMOL/L (ref 136–145)

## 2021-06-04 PROCEDURE — 99214 OFFICE O/P EST MOD 30 MIN: CPT | Performed by: FAMILY MEDICINE

## 2021-06-04 RX ORDER — DESVENLAFAXINE 50 MG/1
TABLET, EXTENDED RELEASE ORAL
Qty: 90 TABLET | Refills: 1 | Status: SHIPPED | OUTPATIENT
Start: 2021-06-04 | End: 2021-12-10 | Stop reason: SDUPTHER

## 2021-06-04 RX ORDER — ATORVASTATIN CALCIUM 20 MG/1
20 TABLET, FILM COATED ORAL DAILY
Qty: 90 TABLET | Refills: 1 | Status: SHIPPED | OUTPATIENT
Start: 2021-06-04 | End: 2021-12-10 | Stop reason: SDUPTHER

## 2021-06-04 RX ORDER — METFORMIN HYDROCHLORIDE 500 MG/1
1000 TABLET, EXTENDED RELEASE ORAL 2 TIMES DAILY
Qty: 360 TABLET | Refills: 1 | Status: SHIPPED | OUTPATIENT
Start: 2021-06-04 | End: 2021-11-21 | Stop reason: SDUPTHER

## 2021-06-04 SDOH — ECONOMIC STABILITY: FOOD INSECURITY: WITHIN THE PAST 12 MONTHS, YOU WORRIED THAT YOUR FOOD WOULD RUN OUT BEFORE YOU GOT MONEY TO BUY MORE.: NEVER TRUE

## 2021-06-04 SDOH — ECONOMIC STABILITY: FOOD INSECURITY: WITHIN THE PAST 12 MONTHS, THE FOOD YOU BOUGHT JUST DIDN'T LAST AND YOU DIDN'T HAVE MONEY TO GET MORE.: NEVER TRUE

## 2021-06-04 ASSESSMENT — SOCIAL DETERMINANTS OF HEALTH (SDOH): HOW HARD IS IT FOR YOU TO PAY FOR THE VERY BASICS LIKE FOOD, HOUSING, MEDICAL CARE, AND HEATING?: NOT HARD AT ALL

## 2021-06-05 LAB
ESTIMATED AVERAGE GLUCOSE: 119.8 MG/DL
HBA1C MFR BLD: 5.8 %

## 2021-09-22 ENCOUNTER — E-VISIT (OUTPATIENT)
Dept: FAMILY MEDICINE CLINIC | Age: 61
End: 2021-09-22

## 2021-09-22 DIAGNOSIS — F50.81 BINGE EATING DISORDER: ICD-10-CM

## 2021-09-22 DIAGNOSIS — F98.8 ATTENTION DEFICIT DISORDER (ADD) WITHOUT HYPERACTIVITY: Chronic | ICD-10-CM

## 2021-09-22 PROCEDURE — 99421 OL DIG E/M SVC 5-10 MIN: CPT | Performed by: FAMILY MEDICINE

## 2021-09-22 NOTE — PROGRESS NOTES
Patient Active Problem List   Diagnosis    Attention deficit disorder    Sleep apnea    Hypothyroidism    Binge eating disorder    Major depressive disorder, recurrent episode, moderate with anxious distress (HCC)    Pre-diabetes    Iliotibial band syndrome of left side    Tubular adenoma of colon      Past Medical History:   Diagnosis Date    Anemia 3/25/2013    Attention deficit disorder without mention of hyperactivity     Calculus of kidney     h/o    Calculus of kidney     Cystocele, midline     h/o    Other bipolar disorders     Polyp of colon, adenomatous 1/2015    Rectocele     h/o    Type 2 diabetes mellitus without complication, without long-term current use of insulin [facility] 6/6/[facility]    Unspecified hypothyroidism     Unspecified sleep apnea      Social History     Tobacco Use    Smoking status: Never Smoker    Smokeless tobacco: Never Used   Substance Use Topics    Alcohol use: Yes     Alcohol/week: 0.0 standard drinks     Comment: infrequent    Drug use: No      Allergies   Allergen Reactions    Latex Rash    Penicillins     Cefdinir Other (See Comments)     dysphagia      Current Outpatient Medications on File Prior to Visit   Medication Sig Dispense Refill    lisdexamfetamine (VYVANSE) 50 MG capsule Take 1 capsule by mouth every morning for 30 days. 30 capsule 0    lisdexamfetamine (VYVANSE) 50 MG capsule Take 1 capsule by mouth every morning for 30 days. 30 capsule 0    lisdexamfetamine (VYVANSE) 50 MG capsule Take 1 capsule by mouth every morning for 30 days.  30 capsule 0    desvenlafaxine succinate (PRISTIQ) 50 MG TB24 extended release tablet TAKE 1 TABLET BY MOUTH EVERY DAY 90 tablet 1    metFORMIN (GLUCOPHAGE-XR) 500 MG extended release tablet Take 2 tablets by mouth 2 times daily 360 tablet 1    atorvastatin (LIPITOR) 20 MG tablet Take 1 tablet by mouth daily 90 tablet 1    buPROPion (WELLBUTRIN XL) 300 MG extended release tablet TAKE 1 TABLET BY MOUTH EVERY DAY IN THE MORNING 90 tablet 1    levothyroxine (SYNTHROID) 125 MCG tablet TAKE 1 TABLET BY MOUTH EVERY DAY 90 tablet 2    Lancets MISC 1 each by Does not apply route daily 100 each 5    glucose monitoring kit (FREESTYLE) monitoring kit 1 kit by Does not apply route daily 1 kit 0    blood glucose test strips (ASCENSIA AUTODISC VI;ONE TOUCH ULTRA TEST VI) strip 1 each by Does not apply route daily 753 strip 3    folic acid (FOLVITE) 1 MG tablet Take 1 mg by mouth daily      Cholecalciferol (VITAMIN D3) 2000 units CAPS Take by mouth      Omega-3 Fatty Acids (FISH OIL ADULT GUMMIES PO) Take by mouth      melatonin 3 MG TABS tablet Take 3 mg by mouth daily      FIBER, CORN DEXTRIN, PO Take 1 tablet by mouth daily.  Magnesium 500 MG CAPS Take 1 tablet by mouth daily.  calcium citrate-vitamin D (CITRICAL + D) 315-250 MG-UNIT TABS Take  by mouth.  vitamin B-12 (CYANOCOBALAMIN) 500 MCG tablet Take 500 mcg by mouth daily.  therapeutic multivitamin-minerals (THERAGRAN-M) tablet Take 1 tablet by mouth daily. No current facility-administered medications on file prior to visit. Wt Readings from Last 3 Encounters:   06/04/21 202 lb (91.6 kg)   12/04/20 196 lb (88.9 kg)   06/04/20 200 lb (90.7 kg)     Temp Readings from Last 3 Encounters:   06/04/21 97.7 °F (36.5 °C) (Temporal)   12/04/20 98 °F (36.7 °C) (Oral)   06/04/20 98.2 °F (36.8 °C) (Oral)     BP Readings from Last 3 Encounters:   06/04/21 124/68   12/04/20 125/69   12/13/19 122/82     Pulse Readings from Last 3 Encounters:   06/04/21 80   12/04/20 80   12/13/19 75      Lab Results   Component Value Date     06/04/2021    K 4.2 06/04/2021     06/04/2021    CO2 27 06/04/2021    BUN 14 06/04/2021    CREATININE 0.7 06/04/2021    GLUCOSE 99 06/04/2021    CALCIUM 9.7 06/04/2021       PDMP reviewed and no concerns noted. Diagnosis Orders   1.  Attention deficit disorder (ADD) without hyperactivity  lisdexamfetamine (VYVANSE) 50 MG capsule    lisdexamfetamine (VYVANSE) 50 MG capsule    lisdexamfetamine (VYVANSE) 50 MG capsule   2. Binge eating disorder  lisdexamfetamine (VYVANSE) 50 MG capsule    lisdexamfetamine (VYVANSE) 50 MG capsule    lisdexamfetamine (VYVANSE) 50 MG capsule    5-10 minutes were spent on the digital evaluation and management of this patient.

## 2021-10-25 LAB — DIABETIC RETINOPATHY: NEGATIVE

## 2021-11-21 DIAGNOSIS — R73.03 PRE-DIABETES: ICD-10-CM

## 2021-11-22 RX ORDER — METFORMIN HYDROCHLORIDE 500 MG/1
1000 TABLET, EXTENDED RELEASE ORAL 2 TIMES DAILY
Qty: 360 TABLET | Refills: 1 | Status: SHIPPED | OUTPATIENT
Start: 2021-11-22 | End: 2022-06-24 | Stop reason: SDUPTHER

## 2021-11-22 NOTE — TELEPHONE ENCOUNTER
Requested Prescriptions     Pending Prescriptions Disp Refills    metFORMIN (GLUCOPHAGE-XR) 500 MG extended release tablet 360 tablet 1     Sig: Take 2 tablets by mouth 2 times daily       lov 9/22/2021  Nov 12/10/21  Labs 6/4/21

## 2021-12-06 NOTE — PROGRESS NOTES
Subjective:      Patient ID: Majo Vega [facility] 63 y.o. female. The primary encounter diagnosis was Pre-diabetes. Diagnoses of Attention deficit disorder (ADD) without hyperactivity, Binge eating disorder, Pure hypercholesterolemia, Major depressive disorder, recurrent episode, moderate with anxious distress ([address] Acquired hypothyroidism, and Need for influenza vaccination were also pertinent to this visit. HPI    ADD/ADHD and binge eating:  Current treatment: Vyvanse- 50 mg, which has been effective. At times does not last long enough. Would like to add a short acting at the end of the day as needed. Residual symptoms: none. Medication side effects: None. Patient denies None. Is not binging. Depression and anxiety: improved with move near family and ADD tx. Takes Wellbutrin and Pristiq. Never hospitalized and never suicidal.  No hx substance abuse. Depression is very well controlled. Exercising every day - helps a lot. Is seeing a therapist.    Thyroid: Patient presents for evaluation of hypothyroidism. Current symptoms include denies fatigue, weight changes, heat/cold intolerance, bowel/skin changes or CVS symptoms. Hyperlipidemia:  No new myalgias or GI upset on atorvastatin (Lipitor). Medication compliance: compliant most of the time. Patient is  following a low fat, low cholesterol diet. She is  exercising regularly.      Lab Results   Component Value Date    CHOL 158 12/04/2020    TRIG 47 12/04/2020    HDL 98 (H) 12/04/2020    LDLCALC 51 12/04/2020     Lab Results   Component Value Date    ALT 20 12/04/2020    AST 19 12/04/2020         Labs Renal Latest Ref Rng & Units 6/4/2021 12/4/2020 9/6/2019 6/6/2019 3/8/2019   BUN 7 - 20 mg/dL 14 20 20 14 20   Cr 0.6 - 1.1 mg/dL 0.7 0.7 0.7 0.6 0.8   K 3.5 - 5.1 mmol/L 4.2 4.6 4.3 4.1 4.2   Na 136 - 145 mmol/L 140 141 140 140 142     Lab Results   Component Value Date    LABA1C 5.8 06/04/2021     Lab Results   Component Value Date    .8 daily      Cholecalciferol (VITAMIN D3) 2000 units CAPS Take by mouth      Omega-3 Fatty Acids (FISH OIL ADULT GUMMIES PO) Take by mouth      melatonin 3 MG TABS tablet Take 3 mg by mouth daily      FIBER, CORN DEXTRIN, PO Take 1 tablet by mouth daily.  Magnesium 500 MG CAPS Take 1 tablet by mouth daily.  calcium citrate-vitamin D (CITRICAL + D) 315-250 MG-UNIT TABS Take  by mouth.  vitamin B-12 (CYANOCOBALAMIN) 500 MCG tablet Take 500 mcg by mouth daily.  therapeutic multivitamin-minerals (THERAGRAN-M) tablet Take 1 tablet by mouth daily. Allergies   Allergen Reactions    Latex Rash    Penicillins     Cefdinir Other (See Comments)     dysphagia       Patient Active Problem List   Diagnosis    Attention deficit disorder    Sleep apnea    Hypothyroidism    Binge eating disorder    Major depressive disorder, recurrent episode, moderate with anxious distress (HCC)    Pre-diabetes    Iliotibial band syndrome of left side    Tubular adenoma of colon       Past Medical History:   Diagnosis Date    Anemia 3/25/2013    Attention deficit disorder without mention of hyperactivity     Calculus of kidney     h/o    Calculus of kidney     Cystocele, midline     h/o    Other bipolar disorders     Polyp of colon, adenomatous 1/2015    Rectocele     h/o    Type 2 diabetes mellitus without complication, without long-term current use of insulin ([facility] [address] 6/6/2019    Unspecified hypothyroidism     Unspecified sleep apnea        No past surgical history on file. Family History   Problem Relation Age of Onset    Diabetes Mother     Osteoporosis Mother     High Cholesterol Father        Social History     Tobacco Use    Smoking status: Never Smoker    Smokeless tobacco: Never Used   Substance Use Topics    Alcohol use:  Yes     Alcohol/week: 0.0 standard drinks     Comment: infrequent    Drug use: No            Review of Systems  Review of Systems    Objective:   Physical screening mammogram for malignant neoplasm of breast  [facility] DIGITAL SCREEN W OR WO CAD BILATERAL          Plan:      Side effects of current medications reviewed and questions answered. Follow up in 3 months or prn.

## 2021-12-10 ENCOUNTER — OFFICE VISIT (OUTPATIENT)
Dept: FAMILY MEDICINE CLINIC | Age: 61
End: 2021-12-10

## 2021-12-10 VITALS
SYSTOLIC BLOOD PRESSURE: 132 MMHG | OXYGEN SATURATION: 97 % | BODY MASS INDEX: 36.65 KG/M2 | TEMPERATURE: 97.5 F | WEIGHT: 200.4 LBS | DIASTOLIC BLOOD PRESSURE: 76 MMHG | HEART RATE: 86 BPM | RESPIRATION RATE: 12 BRPM

## 2021-12-10 DIAGNOSIS — F33.1 MAJOR DEPRESSIVE DISORDER, RECURRENT EPISODE, MODERATE WITH ANXIOUS DISTRESS (HCC): ICD-10-CM

## 2021-12-10 DIAGNOSIS — R73.03 PRE-DIABETES: ICD-10-CM

## 2021-12-10 DIAGNOSIS — F98.8 ATTENTION DEFICIT DISORDER (ADD) WITHOUT HYPERACTIVITY: ICD-10-CM

## 2021-12-10 DIAGNOSIS — E78.00 PURE HYPERCHOLESTEROLEMIA: ICD-10-CM

## 2021-12-10 DIAGNOSIS — F50.81 BINGE EATING DISORDER: ICD-10-CM

## 2021-12-10 DIAGNOSIS — E03.9 ACQUIRED HYPOTHYROIDISM: ICD-10-CM

## 2021-12-10 DIAGNOSIS — R73.03 PRE-DIABETES: Primary | ICD-10-CM

## 2021-12-10 DIAGNOSIS — Z12.31 ENCOUNTER FOR SCREENING MAMMOGRAM FOR MALIGNANT NEOPLASM OF BREAST: ICD-10-CM

## 2021-12-10 LAB
A/G RATIO: 1.8 (ref 1.1–2.2)
ALBUMIN SERPL-MCNC: 4.6 G/DL (ref 3.4–5)
ALP BLD-CCNC: 104 U/L (ref 40–129)
ALT SERPL-CCNC: 20 U/L (ref 10–40)
ANION GAP SERPL CALCULATED.3IONS-SCNC: 13 MMOL/L (ref 3–16)
AST SERPL-CCNC: 21 U/L (ref 15–37)
BILIRUB SERPL-MCNC: 0.4 MG/DL (ref 0–1)
BUN BLDV-MCNC: 16 MG/DL (ref 7–20)
CALCIUM SERPL-MCNC: 9.6 MG/DL (ref 8.3–10.6)
CHLORIDE BLD-SCNC: 101 MMOL/L (ref 99–110)
CHOLESTEROL, TOTAL: 147 MG/DL (ref 0–199)
CO2: 25 MMOL/L (ref 21–32)
CREAT SERPL-MCNC: 0.7 MG/DL (ref 0.6–1.1)
GFR AFRICAN AMERICAN: >60
GFR NON-AFRICAN AMERICAN: >60
GLUCOSE BLD-MCNC: 95 MG/DL (ref 70–99)
HDLC SERPL-MCNC: 89 MG/DL (ref 40–60)
LDL CHOLESTEROL CALCULATED: 46 MG/DL
POTASSIUM SERPL-SCNC: 4.3 MMOL/L (ref 3.5–5.1)
SODIUM BLD-SCNC: 139 MMOL/L (ref 136–145)
TOTAL PROTEIN: 7.1 G/DL (ref 6.4–8.2)
TRIGL SERPL-MCNC: 59 MG/DL (ref 0–150)
TSH REFLEX: 0.47 UIU/ML (ref 0.27–4.2)
VLDLC SERPL CALC-MCNC: 12 MG/DL

## 2021-12-10 PROCEDURE — 99214 OFFICE O/P EST MOD 30 MIN: CPT | Performed by: FAMILY MEDICINE

## 2021-12-10 RX ORDER — DEXTROAMPHETAMINE SACCHARATE, AMPHETAMINE ASPARTATE, DEXTROAMPHETAMINE SULFATE AND AMPHETAMINE SULFATE 2.5; 2.5; 2.5; 2.5 MG/1; MG/1; MG/1; MG/1
10-20 TABLET ORAL DAILY
Qty: 60 TABLET | Refills: 0 | Status: SHIPPED | OUTPATIENT
Start: 2021-12-10 | End: 2022-01-09

## 2021-12-10 RX ORDER — DESVENLAFAXINE 50 MG/1
TABLET, EXTENDED RELEASE ORAL
Qty: 90 TABLET | Refills: 1 | Status: SHIPPED | OUTPATIENT
Start: 2021-12-10 | End: 2022-06-24 | Stop reason: SDUPTHER

## 2021-12-10 RX ORDER — BUPROPION HYDROCHLORIDE 300 MG/1
TABLET ORAL
Qty: 90 TABLET | Refills: 1 | Status: SHIPPED | OUTPATIENT
Start: 2021-12-10 | End: 2022-06-24 | Stop reason: SDUPTHER

## 2021-12-10 RX ORDER — ATORVASTATIN CALCIUM 20 MG/1
20 TABLET, FILM COATED ORAL DAILY
Qty: 90 TABLET | Refills: 1 | Status: SHIPPED | OUTPATIENT
Start: 2021-12-10 | End: 2022-06-24 | Stop reason: SDUPTHER

## 2021-12-10 RX ORDER — LEVOTHYROXINE SODIUM 0.12 MG/1
TABLET ORAL
Qty: 90 TABLET | Refills: 2 | Status: CANCELLED | OUTPATIENT
Start: 2021-12-10

## 2021-12-11 LAB
ESTIMATED AVERAGE GLUCOSE: 119.8 MG/DL
HBA1C MFR BLD: 5.8 %

## 2021-12-12 DIAGNOSIS — E03.9 ACQUIRED HYPOTHYROIDISM: Chronic | ICD-10-CM

## 2021-12-12 RX ORDER — LEVOTHYROXINE SODIUM 0.12 MG/1
125 TABLET ORAL DAILY
Qty: 90 TABLET | Refills: 3 | Status: SHIPPED | OUTPATIENT
Start: 2021-12-12

## 2022-03-30 ENCOUNTER — E-VISIT (OUTPATIENT)
Dept: FAMILY MEDICINE CLINIC | Age: 62
End: 2022-03-30

## 2022-03-30 DIAGNOSIS — F98.8 ATTENTION DEFICIT DISORDER (ADD) WITHOUT HYPERACTIVITY: ICD-10-CM

## 2022-03-30 DIAGNOSIS — F50.81 BINGE EATING DISORDER: ICD-10-CM

## 2022-03-30 PROCEDURE — 99421 OL DIG E/M SVC 5-10 MIN: CPT | Performed by: FAMILY MEDICINE

## 2022-03-30 NOTE — PROGRESS NOTES
Patient Active Problem List   Diagnosis    Attention deficit disorder    Sleep apnea    Hypothyroidism    Binge eating disorder    Major depressive disorder, recurrent episode, moderate with anxious distress (HCC)    Pre-diabetes    Iliotibial band syndrome of left side    Tubular adenoma of colon      Past Medical History:   Diagnosis Date    Anemia 3/25/2013    Attention deficit disorder without mention of hyperactivity     Calculus of kidney     h/o    Calculus of kidney     Cystocele, midline     h/o    Other bipolar disorders     Polyp of colon, adenomatous 1/2015    Rectocele     h/o    Type 2 diabetes mellitus without complication, without long-term current use of insulin ([facility] 6/6/2019    Unspecified hypothyroidism     Unspecified sleep apnea      Social History     Tobacco Use    Smoking status: Never Smoker    Smokeless tobacco: Never Used   Substance Use Topics    Alcohol use: Yes     Alcohol/week: 0.0 standard drinks     Comment: infrequent    Drug use: No      Allergies   Allergen Reactions    Latex Rash    Penicillins     Cefdinir Other (See Comments)     dysphagia      Current Outpatient Medications on File Prior to Visit   Medication Sig Dispense Refill    levothyroxine (SYNTHROID) 125 MCG tablet Take 1 tablet by mouth Daily 90 tablet 3    atorvastatin (LIPITOR) 20 MG tablet Take 1 tablet by mouth daily 90 tablet 1    desvenlafaxine succinate (PRISTIQ) 50 MG TB24 extended release tablet TAKE 1 TABLET BY MOUTH EVERY DAY 90 tablet 1    buPROPion (WELLBUTRIN XL) 300 MG extended release tablet TAKE 1 TABLET BY MOUTH EVERY DAY IN THE MORNING 90 tablet 1    lisdexamfetamine (VYVANSE) 50 MG capsule Take 1 capsule by mouth every morning for 30 days. 30 capsule 0    lisdexamfetamine (VYVANSE) 50 MG capsule Take 1 capsule by mouth every morning for 30 days. 30 capsule 0    lisdexamfetamine (VYVANSE) 50 MG capsule Take 1 capsule by mouth every morning for 30 days.  30 capsule 0  Ferrous Sulfate (IRON PO) Take by mouth daily gummies      amphetamine-dextroamphetamine (ADDERALL, 10MG,) 10 MG tablet Take 1-2 tablets by mouth daily for 30 days. 60 tablet 0    metFORMIN (GLUCOPHAGE-XR) 500 MG extended release tablet Take 2 tablets by mouth 2 times daily 360 tablet 1    Lancets MISC 1 each by Does not apply route daily 100 each 5    glucose monitoring kit (FREESTYLE) monitoring kit 1 kit by Does not apply route daily 1 kit 0    blood glucose test strips (ASCENSIA AUTODISC VI;ONE TOUCH ULTRA TEST VI) strip 1 each by Does not apply route daily 823 strip 3    folic acid (FOLVITE) 1 MG tablet Take 1 mg by mouth daily      Cholecalciferol (VITAMIN D3) 2000 units CAPS Take by mouth      Omega-3 Fatty Acids (FISH OIL ADULT GUMMIES PO) Take by mouth      melatonin 3 MG TABS tablet Take 3 mg by mouth daily      FIBER, CORN DEXTRIN, PO Take 1 tablet by mouth daily.  Magnesium 500 MG CAPS Take 1 tablet by mouth daily.  calcium citrate-vitamin D (CITRICAL + D) 315-250 MG-UNIT TABS Take  by mouth.  vitamin B-12 (CYANOCOBALAMIN) 500 MCG tablet Take 500 mcg by mouth daily.  therapeutic multivitamin-minerals (THERAGRAN-M) tablet Take 1 tablet by mouth daily. No current facility-administered medications on file prior to visit. PDMP reviewed and no concerns noted. Diagnosis Orders   1. Attention deficit disorder (ADD) without hyperactivity  lisdexamfetamine (VYVANSE) 50 MG capsule    lisdexamfetamine (VYVANSE) 50 MG capsule    lisdexamfetamine (VYVANSE) 50 MG capsule   2. Binge eating disorder  lisdexamfetamine (VYVANSE) 50 MG capsule    lisdexamfetamine (VYVANSE) 50 MG capsule    lisdexamfetamine (VYVANSE) 50 MG capsule      5-10 minutes were spent on the digital evaluation and management of this patient.

## 2022-05-09 ENCOUNTER — TELEPHONE (OUTPATIENT)
Dept: FAMILY MEDICINE CLINIC | Age: 62
End: 2022-05-09

## 2022-05-09 NOTE — TELEPHONE ENCOUNTER
Pt called [facility] [address] and tested positive for covid. They wanted her pcp to verify that it was ok for them to prescribe her Paxlovid.

## 2022-06-24 ENCOUNTER — OFFICE VISIT (OUTPATIENT)
Dept: FAMILY MEDICINE CLINIC | Age: 62
End: 2022-06-24

## 2022-06-24 VITALS
HEART RATE: 68 BPM | DIASTOLIC BLOOD PRESSURE: 78 MMHG | OXYGEN SATURATION: 98 % | BODY MASS INDEX: 40.48 KG/M2 | HEIGHT: 62 IN | SYSTOLIC BLOOD PRESSURE: 138 MMHG | WEIGHT: 220 LBS

## 2022-06-24 DIAGNOSIS — F33.1 MAJOR DEPRESSIVE DISORDER, RECURRENT EPISODE, MODERATE WITH ANXIOUS DISTRESS (HCC): ICD-10-CM

## 2022-06-24 DIAGNOSIS — F98.8 ATTENTION DEFICIT DISORDER (ADD) WITHOUT HYPERACTIVITY: ICD-10-CM

## 2022-06-24 DIAGNOSIS — E78.00 PURE HYPERCHOLESTEROLEMIA: ICD-10-CM

## 2022-06-24 DIAGNOSIS — R73.03 PRE-DIABETES: ICD-10-CM

## 2022-06-24 DIAGNOSIS — Z23 NEED FOR TDAP VACCINATION: ICD-10-CM

## 2022-06-24 DIAGNOSIS — F50.81 BINGE EATING DISORDER: Primary | ICD-10-CM

## 2022-06-24 DIAGNOSIS — E03.9 ACQUIRED HYPOTHYROIDISM: ICD-10-CM

## 2022-06-24 PROCEDURE — 99214 OFFICE O/P EST MOD 30 MIN: CPT | Performed by: FAMILY MEDICINE

## 2022-06-24 RX ORDER — BUPROPION HYDROCHLORIDE 300 MG/1
TABLET ORAL
Qty: 90 TABLET | Refills: 1 | Status: SHIPPED | OUTPATIENT
Start: 2022-06-24

## 2022-06-24 RX ORDER — DESVENLAFAXINE 50 MG/1
TABLET, EXTENDED RELEASE ORAL
Qty: 90 TABLET | Refills: 1 | Status: SHIPPED | OUTPATIENT
Start: 2022-06-24

## 2022-06-24 RX ORDER — METFORMIN HYDROCHLORIDE 500 MG/1
1000 TABLET, EXTENDED RELEASE ORAL 2 TIMES DAILY
Qty: 360 TABLET | Refills: 1 | Status: SHIPPED | OUTPATIENT
Start: 2022-06-24

## 2022-06-24 RX ORDER — ATORVASTATIN CALCIUM 20 MG/1
20 TABLET, FILM COATED ORAL DAILY
Qty: 90 TABLET | Refills: 1 | Status: SHIPPED | OUTPATIENT
Start: 2022-06-24

## 2022-06-24 RX ORDER — BUPROPION HYDROCHLORIDE 300 MG/1
TABLET ORAL
Qty: 90 TABLET | Refills: 1 | OUTPATIENT
Start: 2022-06-24

## 2022-06-24 SDOH — ECONOMIC STABILITY: FOOD INSECURITY: WITHIN THE PAST 12 MONTHS, YOU WORRIED THAT YOUR FOOD WOULD RUN OUT BEFORE YOU GOT MONEY TO BUY MORE.: NEVER TRUE

## 2022-06-24 SDOH — ECONOMIC STABILITY: FOOD INSECURITY: WITHIN THE PAST 12 MONTHS, THE FOOD YOU BOUGHT JUST DIDN'T LAST AND YOU DIDN'T HAVE MONEY TO GET MORE.: NEVER TRUE

## 2022-06-24 ASSESSMENT — PATIENT HEALTH QUESTIONNAIRE - PHQ9
1. LITTLE INTEREST OR PLEASURE IN DOING THINGS: 0
SUM OF ALL RESPONSES TO PHQ QUESTIONS 1-9: 0
SUM OF ALL RESPONSES TO PHQ QUESTIONS 1-9: 0
SUM OF ALL RESPONSES TO PHQ9 QUESTIONS 1 & 2: 0
2. FEELING DOWN, DEPRESSED OR HOPELESS: 0
SUM OF ALL RESPONSES TO PHQ QUESTIONS 1-9: 0
SUM OF ALL RESPONSES TO PHQ QUESTIONS 1-9: 0

## 2022-06-24 ASSESSMENT — SOCIAL DETERMINANTS OF HEALTH (SDOH): HOW HARD IS IT FOR YOU TO PAY FOR THE VERY BASICS LIKE FOOD, HOUSING, MEDICAL CARE, AND HEATING?: NOT VERY HARD

## 2022-06-24 NOTE — PROGRESS NOTES
Subjective:      Patient ID: Cathleen Lopez [facility] 63 y.o. female. The primary encounter diagnosis was Binge eating disorder. Diagnoses of Attention deficit disorder (ADD) without hyperactivity, Pre-diabetes, Major depressive disorder, recurrent episode, moderate with anxious distress ([address] and Need for Tdap vaccination were also pertinent to this visit. HPI    DUE TDAP - will have in [place] where she has insurance. ADD/ADHD/binge eating disorder/depression:  Current treatment: Pristiq, Wellbutrin,  Vyvanse- 50 mg, which has been effective. Residual symptoms: none. Medication side effects: None. Patient denies anorexia, palpitations, insomnia, irritability and anxiety. Was binging more in January and February. Is back under control now. She is seeing therapist once a week, has been very helpful. Would like to consider weaning off the anti-depressants once she starts to exercise again. Pre-diabetes: managed with diet, exercise and Metformin. Diet: has not been doing as well. Exercise:  2 times a week. Aerobic and resistance. FSBS:   Weight is up 20 lbs. 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 Has been around people who are eating more and she is eating with them. She just became aware of this and is doing better. She is happier than she has been in a very long time. Has lost 4 to 5 lbs.        Lab Results   Component Value Date     12/10/2021    K 4.3 12/10/2021     12/10/2021    CO2 25 12/10/2021    BUN 16 12/10/2021    CREATININE 0.7 12/10/2021    GLUCOSE 95 12/10/2021    CALCIUM 9.6 12/10/2021       Lab Results   Component Value Date    LABA1C 5.8 12/10/2021     Lab Results   Component Value Date    .8 12/10/2021      TSH   Date Value Ref Range Status   12/10/2021 0.47 0.27 - 4.20 uIU/mL Final   12/04/2020 0.45 0.27 - 4.20 uIU/mL Final   09/06/2019 0.08 (L) 0.27 - 4.20 uIU/mL Final   05/06/2016 2.51 0.27 - 4.20 uIU/mL Final   03/20/2013 3.45 0.35 - 5.5 uIU/ml Final   01/24/2012 1.93 0.35 - 5.5 uIU/ml Final   01/19/2011 3.36 0.35 - 5.5 uIU/ml Final     Lab Results   Component Value Date    CHOL 147 12/10/2021    CHOL 158 12/04/2020    CHOL 150 09/06/2019     Lab Results   Component Value Date    TRIG 59 12/10/2021    TRIG 47 12/04/2020    TRIG 63 09/06/2019     Lab Results   Component Value Date    HDL 89 (H) 12/10/2021    HDL 98 (H) 12/04/2020    HDL 91 (H) 09/06/2019     Lab Results   Component Value Date    LDLCALC 46 12/10/2021    LDLCALC 51 12/04/2020    LDLCALC 46 09/06/2019     Lab Results   Component Value Date    LABVLDL 12 12/10/2021    LABVLDL 9 12/04/2020    LABVLDL 13 09/06/2019     No results found for: [place]         Outpatient Medications Marked as Taking for the 6/24/22 encounter (Office Visit) with Naseem Uribe MD   Medication Sig Dispense Refill    lisdexamfetamine (VYVANSE) 50 MG capsule Take 1 capsule by mouth every morning for 30 days.  30 capsule 0    levothyroxine (SYNTHROID) 125 MCG tablet Take 1 tablet by mouth Daily 90 tablet 3    atorvastatin (LIPITOR) 20 MG tablet Take 1 tablet by mouth daily 90 tablet 1    desvenlafaxine succinate (PRISTIQ) 50 MG TB24 extended release tablet TAKE 1 TABLET BY MOUTH EVERY DAY 90 tablet 1    buPROPion (WELLBUTRIN XL) 300 MG extended release tablet TAKE 1 TABLET BY MOUTH EVERY DAY IN THE MORNING 90 tablet 1    Ferrous Sulfate (IRON PO) Take by mouth daily gummies      metFORMIN (GLUCOPHAGE-XR) 500 MG extended release tablet Take 2 tablets by mouth 2 times daily 360 tablet 1    Lancets MISC 1 each by Does not apply route daily 100 each 5    glucose monitoring kit (FREESTYLE) monitoring kit 1 kit by Does not apply route daily 1 kit 0    blood glucose test strips (ASCENSIA AUTODISC VI;ONE TOUCH ULTRA TEST VI) strip 1 each by Does not apply route daily 565 strip 3    folic acid (FOLVITE) 1 MG tablet Take 1 mg by mouth daily      Cholecalciferol (VITAMIN D3) 2000 units CAPS Take by mouth  Omega-3 Fatty Acids (FISH OIL ADULT GUMMIES PO) Take by mouth      melatonin 3 MG TABS tablet Take 3 mg by mouth daily      FIBER, CORN DEXTRIN, PO Take 1 tablet by mouth daily.  Magnesium 500 MG CAPS Take 1 tablet by mouth daily.  calcium citrate-vitamin D (CITRICAL + D) 315-250 MG-UNIT TABS Take  by mouth.  vitamin B-12 (CYANOCOBALAMIN) 500 MCG tablet Take 500 mcg by mouth daily.  therapeutic multivitamin-minerals (THERAGRAN-M) tablet Take 1 tablet by mouth daily. Allergies   Allergen Reactions    Latex Rash    Penicillins     Cefdinir Other (See Comments)     dysphagia       Patient Active Problem List   Diagnosis    Attention deficit disorder    Sleep apnea    Hypothyroidism    Binge eating disorder    Major depressive disorder, recurrent episode, moderate with anxious distress (HCC)    Pre-diabetes    Iliotibial band syndrome of left side    Tubular adenoma of colon       Past Medical History:   Diagnosis Date    Anemia 3/25/2013    Attention deficit disorder without mention of hyperactivity     Calculus of kidney     h/o    Calculus of kidney     Cystocele, midline     h/o    Other bipolar disorders     Polyp of colon, adenomatous 1/2015    Rectocele     h/o    Type 2 diabetes mellitus without complication, without long-term current use of insulin ([facility] 6/6/2019    Unspecified hypothyroidism     Unspecified sleep apnea        No past surgical history on file. Family History   Problem Relation Age of Onset    Diabetes Mother     Osteoporosis Mother     High Cholesterol Father     Hypertension Sister     Hypertension Brother        Social History     Tobacco Use    Smoking status: Never Smoker    Smokeless tobacco: Never Used   Substance Use Topics    Alcohol use:  Yes     Alcohol/week: 0.0 standard drinks     Comment: infrequent    Drug use: No            Review of Systems  Review of Systems    Objective:   Physical Exam  Vitals:    06/24/22 1030   BP: 138/78   Site: Right Upper Arm   Position: Sitting   Pulse: 68   SpO2: 98%   Weight: 220 lb (99.8 kg)   Height: 5' 2\" (1.575 m)     Wt Readings from Last 3 Encounters:   06/24/22 220 lb (99.8 kg)   12/10/21 200 lb 6.4 oz (90.9 kg)   06/04/21 202 lb (91.6 kg)        Physical Exam    NAD  Skin is warm and dry. Mood and affect are normal.  No agitation or psychomotor retardation. No pressured speech, grandiosity or tangential thoughts. Insight and judgement are intact. Not suicidal.   The neck is supple and free of adenopathy or masses, the thyroid is normal without enlargement or nodules. Chest is clear, no wheezing or rales. Normal symmetric air entry throughout both lung fields. Heart regular with normal rate, no murmer or gallop     Assessment:       Diagnosis Orders   1. Binge eating disorder  lisdexamfetamine (VYVANSE) 50 MG capsule    lisdexamfetamine (VYVANSE) 50 MG capsule    lisdexamfetamine (VYVANSE) 50 MG capsule    Well controlled. PDMP reviewed and no concerns noted. 2. Attention deficit disorder (ADD) without hyperactivity  lisdexamfetamine (VYVANSE) 50 MG capsule    lisdexamfetamine (VYVANSE) 50 MG capsule    lisdexamfetamine (VYVANSE) 50 MG capsule   3. Pre-diabetes  Comprehensive Metabolic Panel    Hemoglobin A1C  Discussed diet, exercise and weight loss strategies    4. Major depressive disorder, recurrent episode, moderate with anxious distress ([address]  Well controlled  Consider decrease pristiq    5. Need for Tdap vaccination  will have in [place]   6. Acquired hypothyroidism  TSH with Reflex   7. Pure hypercholesterolemia  Comprehensive Metabolic Panel    Lipid Panel          Plan:      Side effects of current medications reviewed and questions answered. Follow up in 3 months or prn.

## 2022-06-25 DIAGNOSIS — E03.9 ACQUIRED HYPOTHYROIDISM: ICD-10-CM

## 2022-06-25 DIAGNOSIS — E78.00 PURE HYPERCHOLESTEROLEMIA: ICD-10-CM

## 2022-06-25 DIAGNOSIS — R73.03 PRE-DIABETES: ICD-10-CM

## 2022-06-25 LAB
A/G RATIO: 1.8 (ref 1.1–2.2)
ALBUMIN SERPL-MCNC: 4.6 G/DL (ref 3.4–5)
ALP BLD-CCNC: 134 U/L (ref 40–129)
ALT SERPL-CCNC: 21 U/L (ref 10–40)
ANION GAP SERPL CALCULATED.3IONS-SCNC: 10 MMOL/L (ref 3–16)
AST SERPL-CCNC: 15 U/L (ref 15–37)
BILIRUB SERPL-MCNC: 0.3 MG/DL (ref 0–1)
BUN BLDV-MCNC: 16 MG/DL (ref 7–20)
CALCIUM SERPL-MCNC: 9.8 MG/DL (ref 8.3–10.6)
CHLORIDE BLD-SCNC: 105 MMOL/L (ref 99–110)
CHOLESTEROL, TOTAL: 171 MG/DL (ref 0–199)
CO2: 26 MMOL/L (ref 21–32)
CREAT SERPL-MCNC: 0.7 MG/DL (ref 0.6–1.2)
GFR AFRICAN AMERICAN: >60
GFR NON-AFRICAN AMERICAN: >60
GLUCOSE BLD-MCNC: 105 MG/DL (ref 70–99)
HDLC SERPL-MCNC: 84 MG/DL (ref 40–60)
LDL CHOLESTEROL CALCULATED: 76 MG/DL
POTASSIUM SERPL-SCNC: 4.5 MMOL/L (ref 3.5–5.1)
SODIUM BLD-SCNC: 141 MMOL/L (ref 136–145)
TOTAL PROTEIN: 7.1 G/DL (ref 6.4–8.2)
TRIGL SERPL-MCNC: 53 MG/DL (ref 0–150)
TSH REFLEX: 0.35 UIU/ML (ref 0.27–4.2)
VLDLC SERPL CALC-MCNC: 11 MG/DL

## 2022-06-26 LAB
ESTIMATED AVERAGE GLUCOSE: 122.6 MG/DL
HBA1C MFR BLD: 5.9 %

## 2022-10-04 ENCOUNTER — E-VISIT (OUTPATIENT)
Dept: FAMILY MEDICINE CLINIC | Age: 62
End: 2022-10-04

## 2022-10-04 DIAGNOSIS — F98.8 ATTENTION DEFICIT DISORDER (ADD) WITHOUT HYPERACTIVITY: ICD-10-CM

## 2022-10-04 DIAGNOSIS — F50.81 BINGE EATING DISORDER: ICD-10-CM

## 2022-10-04 PROCEDURE — 99421 OL DIG E/M SVC 5-10 MIN: CPT | Performed by: FAMILY MEDICINE

## 2022-10-05 NOTE — PROGRESS NOTES
Patient Active Problem List   Diagnosis    Attention deficit disorder    Sleep apnea    Hypothyroidism    Binge eating disorder    Major depressive disorder, recurrent episode, moderate with anxious distress (HCC)    Pre-diabetes    Iliotibial band syndrome of left side    Tubular adenoma of colon       Past Medical History:   Diagnosis Date    Anemia 3/25/2013    Attention deficit disorder without mention of hyperactivity     Calculus of kidney     h/o    Calculus of kidney     Cystocele, midline     h/o    Other bipolar disorders     Polyp of colon, adenomatous 1/2015    Rectocele     h/o    Type 2 diabetes mellitus without complication, without long-term current use of insulin ([facility] 6/6/2019    Unspecified hypothyroidism     Unspecified sleep apnea      Social History     Tobacco Use    Smoking status: Never    Smokeless tobacco: Never   Substance Use Topics    Alcohol use: Yes     Alcohol/week: 0.0 standard drinks     Comment: infrequent    Drug use: No      Allergies   Allergen Reactions    Latex Rash    Penicillins     Cefdinir Other (See Comments)     dysphagia      Current Outpatient Medications on File Prior to Visit   Medication Sig Dispense Refill    lisdexamfetamine (VYVANSE) 50 MG capsule Take 1 capsule by mouth every morning for 30 days. 30 capsule 0    lisdexamfetamine (VYVANSE) 50 MG capsule Take 1 capsule by mouth every morning for 30 days. 30 capsule 0    lisdexamfetamine (VYVANSE) 50 MG capsule Take 1 capsule by mouth every morning for 30 days.  30 capsule 0    desvenlafaxine succinate (PRISTIQ) 50 MG TB24 extended release tablet TAKE 1 TABLET BY MOUTH EVERY DAY 90 tablet 1    atorvastatin (LIPITOR) 20 MG tablet Take 1 tablet by mouth daily 90 tablet 1    buPROPion (WELLBUTRIN XL) 300 MG extended release tablet TAKE 1 TABLET BY MOUTH EVERY DAY IN THE MORNING 90 tablet 1    metFORMIN (GLUCOPHAGE-XR) 500 MG extended release tablet Take 2 tablets by mouth 2 times daily 360 tablet 1    levothyroxine (SYNTHROID) 125 MCG tablet Take 1 tablet by mouth Daily 90 tablet 3    Ferrous Sulfate (IRON PO) Take by mouth daily gummies      amphetamine-dextroamphetamine (ADDERALL, 10MG,) 10 MG tablet Take 1-2 tablets by mouth daily for 30 days. 60 tablet 0    Lancets MISC 1 each by Does not apply route daily 100 each 5    glucose monitoring kit (FREESTYLE) monitoring kit 1 kit by Does not apply route daily 1 kit 0    blood glucose test strips (ASCENSIA AUTODISC VI;ONE TOUCH ULTRA TEST VI) strip 1 each by Does not apply route daily 642 strip 3    folic acid (FOLVITE) 1 MG tablet Take 1 mg by mouth daily      Cholecalciferol (VITAMIN D3) 2000 units CAPS Take by mouth      Omega-3 Fatty Acids (FISH OIL ADULT GUMMIES PO) Take by mouth      melatonin 3 MG TABS tablet Take 3 mg by mouth daily      FIBER, CORN DEXTRIN, PO Take 1 tablet by mouth daily. Magnesium 500 MG CAPS Take 1 tablet by mouth daily. calcium citrate-vitamin D (CITRICAL + D) 315-250 MG-UNIT TABS Take  by mouth.      vitamin B-12 (CYANOCOBALAMIN) 500 MCG tablet Take 500 mcg by mouth daily. therapeutic multivitamin-minerals (THERAGRAN-M) tablet Take 1 tablet by mouth daily. No current facility-administered medications on file prior to visit. PDMP reviewed and no concerns noted. Diagnosis Orders   1. Binge eating disorder  lisdexamfetamine (VYVANSE) 50 MG capsule    lisdexamfetamine (VYVANSE) 50 MG capsule    lisdexamfetamine (VYVANSE) 50 MG capsule      2. Attention deficit disorder (ADD) without hyperactivity  lisdexamfetamine (VYVANSE) 50 MG capsule    lisdexamfetamine (VYVANSE) 50 MG capsule    lisdexamfetamine (VYVANSE) 50 MG capsule           5-10 minutes were spent on the digital evaluation and management of this patient.

## 2022-10-27 NOTE — TELEPHONE ENCOUNTER
Patient requesting that a Mammogram order to be faxed to Alicia Jenkins @ [phone]. Please advise. Thanks. K+ 3.4 today (10/26)  -KCL 10meq x 3  -PO potassium 40 meq x 2  -f/u am bmp, mg+

## 2022-12-10 NOTE — PROGRESS NOTES
Subjective:      Patient ID: Fallon [facility] 63 y.o. female. The primary encounter diagnosis was Attention deficit disorder (ADD) without hyperactivity. Diagnoses of Binge eating disorder, Major depressive disorder, recurrent episode, moderate with anxious distress ([address] and Pre-diabetes were also pertinent to this visit. HPI    DUE  TDAP,  and mammogram.  She plans to have vaccines and mammogram.     ADD/ADHD/binge eating:  Current treatment: Vyvanse- 50 mg and Adderal 10 which has been effective. Residual symptoms: none. Medication side effects: None. Patient denies anorexia, palpitations, insomnia, irritability, and anxiety. She has some stress. Is living at her [employer], no structure in her life. Mentally is doing well; physically is not taking care of herself. Is not eating a healthy diet. Is not cooking. Is not exercising. Weight is up. Is sleeping very well. Depression: treated with Pristiq and Wellbutrin. No hx of suicidal ideation, hospitalization, substance use. Today:  she is seeing psychologist weekly. Helps. Does not feel depressed. No appetite or sleep disturbance, no anhedonia, Not suicidal.      Prediabetes:  treated with diet, exercise, metformin. HGA1C 5.9 (6/2022) <--5.8 (12/2021) <--5.8 (6/2021)    Is eating less sugar but still not eating great.       The 10-year ASCVD risk score (Valentino LEE, et al., 2019) is: 2.5%    Values used to calculate the score:      Age: 63 years      Sex: Female      Is Non- : No      Diabetic: No      Tobacco smoker: No      Systolic Blood Pressure: 609 mmHg      Is BP treated: No      HDL Cholesterol: 84 mg/dL      Total Cholesterol: 171 mg/dL     Lab Results   Component Value Date     06/25/2022    K 4.5 06/25/2022     06/25/2022    CO2 26 06/25/2022    BUN 16 06/25/2022    CREATININE 0.7 06/25/2022    GLUCOSE 105 (H) 06/25/2022    CALCIUM 9.8 06/25/2022    PROT 7.1 06/25/2022    LABALBU 4.6 06/25/2022 BILITOT 0.3 06/25/2022    ALKPHOS 134 (H) 06/25/2022    AST 15 06/25/2022    ALT 21 06/25/2022    LABGLOM >60 06/25/2022    GFRAA >60 06/25/2022    AGRATIO 1.8 06/25/2022    GLOB 2.4 12/04/2020        Lab Results   Component Value Date    CHOL 171 06/25/2022    CHOL 147 12/10/2021    CHOL 158 12/04/2020     Lab Results   Component Value Date    TRIG 53 06/25/2022    TRIG 59 12/10/2021    TRIG 47 12/04/2020     Lab Results   Component Value Date    HDL 84 (H) 06/25/2022    HDL 89 (H) 12/10/2021    HDL 98 (H) 12/04/2020     Lab Results   Component Value Date    LDLCALC 76 06/25/2022    LDLCALC 46 12/10/2021    LDLCALC 51 12/04/2020     Lab Results   Component Value Date    LABVLDL 11 06/25/2022    LABVLDL 12 12/10/2021    LABVLDL 9 12/04/2020     No results found for: CHOLHDLRATIO   TSH   Date Value Ref Range Status   06/25/2022 0.35 0.27 - 4.20 uIU/mL Final   12/10/2021 0.47 0.27 - 4.20 uIU/mL Final   12/04/2020 0.45 0.27 - 4.20 uIU/mL Final   05/06/2016 2.51 0.27 - 4.20 uIU/mL Final   03/20/2013 3.45 0.35 - 5.5 uIU/ml Final   01/24/2012 1.93 0.35 - 5.5 uIU/ml Final   01/19/2011 3.36 0.35 - 5.5 uIU/ml Final     Hemoglobin A1C   Date Value Ref Range Status   06/25/2022 5.9 See comment % Final     Comment:     Comment:  Diagnosis of Diabetes: > or = 6.5%  Increased risk of diabetes (Prediabetes): 5.7-6.4%  Glycemic Control: Nonpregnant Adults: <7.0%                    Pregnant: <6.0%                  Outpatient Medications Marked as Taking for the 12/12/22 encounter (Office Visit) with Luis Armando Ley MD   Medication Sig Dispense Refill    lisdexamfetamine (VYVANSE) 50 MG capsule Take 1 capsule by mouth every morning for 30 days. 30 capsule 0    lisdexamfetamine (VYVANSE) 50 MG capsule Take 1 capsule by mouth every morning for 30 days. 30 capsule 0    lisdexamfetamine (VYVANSE) 50 MG capsule Take 1 capsule by mouth every morning for 30 days.  30 capsule 0    desvenlafaxine succinate (PRISTIQ) 50 MG TB24 extended release tablet TAKE 1 TABLET BY MOUTH EVERY DAY 90 tablet 1    atorvastatin (LIPITOR) 20 MG tablet Take 1 tablet by mouth daily 90 tablet 1    buPROPion (WELLBUTRIN XL) 300 MG extended release tablet TAKE 1 TABLET BY MOUTH EVERY DAY IN THE MORNING 90 tablet 1    metFORMIN (GLUCOPHAGE-XR) 500 MG extended release tablet Take 2 tablets by mouth 2 times daily 360 tablet 1    levothyroxine (SYNTHROID) 125 MCG tablet Take 1 tablet by mouth Daily 90 tablet 3    Ferrous Sulfate (IRON PO) Take by mouth daily gummies      amphetamine-dextroamphetamine (ADDERALL, 10MG,) 10 MG tablet Take 1-2 tablets by mouth daily for 30 days. 60 tablet 0    Lancets MISC 1 each by Does not apply route daily 100 each 5    glucose monitoring kit (FREESTYLE) monitoring kit 1 kit by Does not apply route daily 1 kit 0    blood glucose test strips (ASCENSIA AUTODISC VI;ONE TOUCH ULTRA TEST VI) strip 1 each by Does not apply route daily 752 strip 3    folic acid (FOLVITE) 1 MG tablet Take 1 mg by mouth daily      Cholecalciferol (VITAMIN D3) 2000 units CAPS Take by mouth      Omega-3 Fatty Acids (FISH OIL ADULT GUMMIES PO) Take by mouth      melatonin 3 MG TABS tablet Take 3 mg by mouth daily      FIBER, CORN DEXTRIN, PO Take 1 tablet by mouth daily. Magnesium 500 MG CAPS Take 1 tablet by mouth daily. calcium citrate-vitamin D (CITRICAL + D) 315-250 MG-UNIT TABS Take  by mouth.      vitamin B-12 (CYANOCOBALAMIN) 500 MCG tablet Take 500 mcg by mouth daily. therapeutic multivitamin-minerals (THERAGRAN-M) tablet Take 1 tablet by mouth daily.           Allergies   Allergen Reactions    Latex Rash    Penicillins     Cefdinir Other (See Comments)     dysphagia       Patient Active Problem List   Diagnosis    Attention deficit disorder    Sleep apnea    Hypothyroidism    Binge eating disorder    Major depressive disorder, recurrent episode, moderate with anxious distress (HCC)    Pre-diabetes    Iliotibial band syndrome of left side    Tubular adenoma of colon       Past Medical History:   Diagnosis Date    Anemia 3/25/2013    Attention deficit disorder without mention of hyperactivity     Calculus of kidney     h/o    Calculus of kidney     Cystocele, midline     h/o    Other bipolar disorders     Polyp of colon, adenomatous 1/2015    Rectocele     h/o    Type 2 diabetes mellitus without complication, without long-term current use of insulin [facility] 6/6/2019    Unspecified hypothyroidism     Unspecified sleep apnea        No past surgical history on file. Family History   Problem Relation Age of Onset    Diabetes Mother     Osteoporosis Mother     High Cholesterol Father     Hypertension Sister     Hypertension Brother        Social History     Tobacco Use    Smoking status: Never    Smokeless tobacco: Never   Substance Use Topics    Alcohol use: Yes     Alcohol/week: 0.0 standard drinks     Comment: infrequent    Drug use: No            Review of Systems  Review of Systems    Objective:   Physical Exam  Vitals:    12/12/22 1047   BP: 136/66   Pulse: 89   Resp: 14   Temp: 97.9 °F (36.6 °C)   TempSrc: Temporal   SpO2: 98%   Weight: 236 lb 3.2 oz (107.1 kg)     Wt Readings from Last 3 Encounters:   12/12/22 236 lb 3.2 oz (107.1 kg)   06/24/22 220 lb (99.8 kg)   12/10/21 200 lb 6.4 oz (90.9 kg)        Physical Exam    NAD  Skin is warm and dry. Mood and affect are normal.  No agitation or psychomotor retardation. No pressured speech, grandiosity or tangential thoughts. Insight and judgement are intact. Not suicidal.   The neck is supple and free of adenopathy or masses, the thyroid is normal without enlargement or nodules. Chest is clear, no wheezing or rales. Normal symmetric air entry throughout both lung fields. Heart regular with normal rate, no murmer or gallop     Assessment:       Diagnosis Orders   1.  Attention deficit disorder (ADD) without hyperactivity  amphetamine-dextroamphetamine (ADDERALL, 10MG,) 10 MG tablet    lisdexamfetamine (VYVANSE) 50 MG capsule    lisdexamfetamine (VYVANSE) 50 MG capsule    lisdexamfetamine (VYVANSE) 50 MG capsule  PDMP reviewed and no concerns noted. Well controlled. 2. Binge eating disorder  lisdexamfetamine (VYVANSE) 50 MG capsule    lisdexamfetamine (VYVANSE) 50 MG capsule    lisdexamfetamine (VYVANSE) 50 MG capsule  Not controlled. Discussed diet, exercise and weight loss strategies       3. Major depressive disorder, recurrent episode, moderate with anxious distress (HCC)  buPROPion (WELLBUTRIN XL) 300 MG extended release tablet  Well controlled. Continue medication and counseling      4. Pre-diabetes  Hemoglobin A1C    metFORMIN (GLUCOPHAGE-XR) 500 MG extended release tablet  Discussed diet, exercise and weight loss strategies       5. Acquired hypothyroidism  levothyroxine (SYNTHROID) 125 MCG tablet    TSH with Reflex  Annual TSH      6. Well adult exam  Comprehensive Metabolic Panel    Lipid Panel      7. Pure hypercholesterolemia  atorvastatin (LIPITOR) 20 MG tablet  Annual labs             Plan:      Side effects of current medications reviewed and questions answered. Follow up in 3 months or prn.

## 2022-12-12 ENCOUNTER — OFFICE VISIT (OUTPATIENT)
Dept: FAMILY MEDICINE CLINIC | Age: 62
End: 2022-12-12

## 2022-12-12 VITALS
WEIGHT: 236.2 LBS | TEMPERATURE: 97.9 F | HEART RATE: 89 BPM | RESPIRATION RATE: 14 BRPM | OXYGEN SATURATION: 98 % | SYSTOLIC BLOOD PRESSURE: 136 MMHG | DIASTOLIC BLOOD PRESSURE: 66 MMHG | BODY MASS INDEX: 43.2 KG/M2

## 2022-12-12 DIAGNOSIS — R73.03 PRE-DIABETES: ICD-10-CM

## 2022-12-12 DIAGNOSIS — F50.81 BINGE EATING DISORDER: ICD-10-CM

## 2022-12-12 DIAGNOSIS — F33.1 MAJOR DEPRESSIVE DISORDER, RECURRENT EPISODE, MODERATE WITH ANXIOUS DISTRESS (HCC): ICD-10-CM

## 2022-12-12 DIAGNOSIS — E03.9 ACQUIRED HYPOTHYROIDISM: Chronic | ICD-10-CM

## 2022-12-12 DIAGNOSIS — E78.00 PURE HYPERCHOLESTEROLEMIA: ICD-10-CM

## 2022-12-12 DIAGNOSIS — Z00.00 WELL ADULT EXAM: ICD-10-CM

## 2022-12-12 DIAGNOSIS — F98.8 ATTENTION DEFICIT DISORDER (ADD) WITHOUT HYPERACTIVITY: Primary | ICD-10-CM

## 2022-12-12 PROCEDURE — 99214 OFFICE O/P EST MOD 30 MIN: CPT | Performed by: FAMILY MEDICINE

## 2022-12-12 RX ORDER — ATORVASTATIN CALCIUM 20 MG/1
20 TABLET, FILM COATED ORAL DAILY
Qty: 90 TABLET | Refills: 1 | Status: SHIPPED | OUTPATIENT
Start: 2022-12-12

## 2022-12-12 RX ORDER — METFORMIN HYDROCHLORIDE 500 MG/1
1000 TABLET, EXTENDED RELEASE ORAL 2 TIMES DAILY
Qty: 360 TABLET | Refills: 1 | Status: SHIPPED | OUTPATIENT
Start: 2022-12-12

## 2022-12-12 RX ORDER — DEXTROAMPHETAMINE SACCHARATE, AMPHETAMINE ASPARTATE, DEXTROAMPHETAMINE SULFATE AND AMPHETAMINE SULFATE 2.5; 2.5; 2.5; 2.5 MG/1; MG/1; MG/1; MG/1
10-20 TABLET ORAL DAILY
Qty: 60 TABLET | Refills: 0 | Status: SHIPPED | OUTPATIENT
Start: 2022-12-12 | End: 2023-01-11

## 2022-12-12 RX ORDER — LEVOTHYROXINE SODIUM 0.12 MG/1
125 TABLET ORAL DAILY
Qty: 90 TABLET | Refills: 1 | Status: SHIPPED | OUTPATIENT
Start: 2022-12-12

## 2022-12-12 RX ORDER — BUPROPION HYDROCHLORIDE 300 MG/1
TABLET ORAL
Qty: 90 TABLET | Refills: 1 | Status: SHIPPED | OUTPATIENT
Start: 2022-12-12

## 2023-02-09 DIAGNOSIS — F33.1 MAJOR DEPRESSIVE DISORDER, RECURRENT EPISODE, MODERATE WITH ANXIOUS DISTRESS (HCC): ICD-10-CM

## 2023-02-09 RX ORDER — DESVENLAFAXINE 50 MG/1
TABLET, EXTENDED RELEASE ORAL
Qty: 90 TABLET | Refills: 1 | Status: SHIPPED | OUTPATIENT
Start: 2023-02-09

## 2023-02-09 NOTE — TELEPHONE ENCOUNTER
Requested Prescriptions     Pending Prescriptions Disp Refills    desvenlafaxine succinate (PRISTIQ) 50 MG TB24 extended release tablet [Pharmacy Med Name: DESVENLAFAXINE SUCCNT ER 50 MG] 90 tablet 1     Sig: TAKE 1 TABLET BY MOUTH EVERY DAY     Last ov 12/12/2022  Last labs 6/25/22

## 2023-09-10 DIAGNOSIS — R73.03 PRE-DIABETES: ICD-10-CM

## 2023-09-10 DIAGNOSIS — F33.1 MAJOR DEPRESSIVE DISORDER, RECURRENT EPISODE, MODERATE WITH ANXIOUS DISTRESS (HCC): ICD-10-CM

## 2023-09-11 RX ORDER — METFORMIN HYDROCHLORIDE 500 MG/1
1000 TABLET, EXTENDED RELEASE ORAL 2 TIMES DAILY
Qty: 360 TABLET | Refills: 1 | OUTPATIENT
Start: 2023-09-11

## 2023-09-11 RX ORDER — DESVENLAFAXINE SUCCINATE 50 MG/1
TABLET, EXTENDED RELEASE ORAL
Qty: 90 TABLET | Refills: 1 | OUTPATIENT
Start: 2023-09-11